# Patient Record
Sex: FEMALE | Race: WHITE | NOT HISPANIC OR LATINO | Employment: FULL TIME | URBAN - METROPOLITAN AREA
[De-identification: names, ages, dates, MRNs, and addresses within clinical notes are randomized per-mention and may not be internally consistent; named-entity substitution may affect disease eponyms.]

---

## 2023-03-10 ENCOUNTER — HOSPITAL ENCOUNTER (EMERGENCY)
Facility: HOSPITAL | Age: 28
End: 2023-03-11
Attending: EMERGENCY MEDICINE

## 2023-03-10 VITALS
HEIGHT: 66 IN | BODY MASS INDEX: 23.03 KG/M2 | SYSTOLIC BLOOD PRESSURE: 113 MMHG | WEIGHT: 143.3 LBS | RESPIRATION RATE: 16 BRPM | HEART RATE: 72 BPM | TEMPERATURE: 98.2 F | OXYGEN SATURATION: 99 % | DIASTOLIC BLOOD PRESSURE: 73 MMHG

## 2023-03-10 DIAGNOSIS — R45.851 SUICIDAL IDEATION: Primary | ICD-10-CM

## 2023-03-10 DIAGNOSIS — F32.A DEPRESSION: Primary | ICD-10-CM

## 2023-03-10 DIAGNOSIS — F32.A DEPRESSION: ICD-10-CM

## 2023-03-10 LAB
ALBUMIN SERPL BCP-MCNC: 4.4 G/DL (ref 3.5–5)
ALP SERPL-CCNC: 37 U/L (ref 34–104)
ALT SERPL W P-5'-P-CCNC: 19 U/L (ref 7–52)
AMPHETAMINES SERPL QL SCN: NEGATIVE
ANION GAP SERPL CALCULATED.3IONS-SCNC: 7 MMOL/L (ref 4–13)
APAP SERPL-MCNC: <10 UG/ML (ref 10–20)
AST SERPL W P-5'-P-CCNC: 17 U/L (ref 13–39)
BARBITURATES UR QL: NEGATIVE
BASOPHILS # BLD AUTO: 0.03 THOUSANDS/ÂΜL (ref 0–0.1)
BASOPHILS NFR BLD AUTO: 1 % (ref 0–1)
BENZODIAZ UR QL: NEGATIVE
BILIRUB SERPL-MCNC: 1.27 MG/DL (ref 0.2–1)
BILIRUB UR QL STRIP: NEGATIVE
BUN SERPL-MCNC: 17 MG/DL (ref 5–25)
CALCIUM SERPL-MCNC: 9.4 MG/DL (ref 8.4–10.2)
CHLORIDE SERPL-SCNC: 103 MMOL/L (ref 96–108)
CLARITY UR: CLEAR
CO2 SERPL-SCNC: 27 MMOL/L (ref 21–32)
COCAINE UR QL: NEGATIVE
COLOR UR: NORMAL
CREAT SERPL-MCNC: 0.68 MG/DL (ref 0.6–1.3)
EOSINOPHIL # BLD AUTO: 0.03 THOUSAND/ÂΜL (ref 0–0.61)
EOSINOPHIL NFR BLD AUTO: 1 % (ref 0–6)
ERYTHROCYTE [DISTWIDTH] IN BLOOD BY AUTOMATED COUNT: 12.2 % (ref 11.6–15.1)
ETHANOL SERPL-MCNC: <10 MG/DL
EXT PREGNANCY TEST URINE: NEGATIVE
EXT. CONTROL: NORMAL
GFR SERPL CREATININE-BSD FRML MDRD: 118 ML/MIN/1.73SQ M
GLUCOSE SERPL-MCNC: 100 MG/DL (ref 65–140)
GLUCOSE UR STRIP-MCNC: NEGATIVE MG/DL
HCT VFR BLD AUTO: 41.5 % (ref 34.8–46.1)
HGB BLD-MCNC: 13.9 G/DL (ref 11.5–15.4)
HGB UR QL STRIP.AUTO: NEGATIVE
IMM GRANULOCYTES # BLD AUTO: 0.01 THOUSAND/UL (ref 0–0.2)
IMM GRANULOCYTES NFR BLD AUTO: 0 % (ref 0–2)
KETONES UR STRIP-MCNC: NEGATIVE MG/DL
LEUKOCYTE ESTERASE UR QL STRIP: NEGATIVE
LYMPHOCYTES # BLD AUTO: 1.31 THOUSANDS/ÂΜL (ref 0.6–4.47)
LYMPHOCYTES NFR BLD AUTO: 25 % (ref 14–44)
MCH RBC QN AUTO: 33.7 PG (ref 26.8–34.3)
MCHC RBC AUTO-ENTMCNC: 33.5 G/DL (ref 31.4–37.4)
MCV RBC AUTO: 101 FL (ref 82–98)
METHADONE UR QL: NEGATIVE
MONOCYTES # BLD AUTO: 0.27 THOUSAND/ÂΜL (ref 0.17–1.22)
MONOCYTES NFR BLD AUTO: 5 % (ref 4–12)
NEUTROPHILS # BLD AUTO: 3.57 THOUSANDS/ÂΜL (ref 1.85–7.62)
NEUTS SEG NFR BLD AUTO: 68 % (ref 43–75)
NITRITE UR QL STRIP: NEGATIVE
NRBC BLD AUTO-RTO: 0 /100 WBCS
OPIATES UR QL SCN: NEGATIVE
OXYCODONE+OXYMORPHONE UR QL SCN: NEGATIVE
PCP UR QL: NEGATIVE
PH UR STRIP.AUTO: 7 [PH]
PLATELET # BLD AUTO: 224 THOUSANDS/UL (ref 149–390)
PMV BLD AUTO: 10.1 FL (ref 8.9–12.7)
POTASSIUM SERPL-SCNC: 3.6 MMOL/L (ref 3.5–5.3)
PROT SERPL-MCNC: 6.9 G/DL (ref 6.4–8.4)
PROT UR STRIP-MCNC: NEGATIVE MG/DL
RBC # BLD AUTO: 4.12 MILLION/UL (ref 3.81–5.12)
SALICYLATES SERPL-MCNC: <5 MG/DL (ref 3–20)
SODIUM SERPL-SCNC: 137 MMOL/L (ref 135–147)
SP GR UR STRIP.AUTO: 1.01
THC UR QL: NEGATIVE
TSH SERPL DL<=0.05 MIU/L-ACNC: 0.97 UIU/ML (ref 0.45–4.5)
UROBILINOGEN UR QL STRIP.AUTO: 0.2 E.U./DL
WBC # BLD AUTO: 5.22 THOUSAND/UL (ref 4.31–10.16)

## 2023-03-10 RX ORDER — HYDROXYZINE HYDROCHLORIDE 25 MG/1
25 TABLET, FILM COATED ORAL
Status: CANCELLED | OUTPATIENT
Start: 2023-03-10

## 2023-03-10 RX ORDER — FLUOXETINE 10 MG/1
10 CAPSULE ORAL DAILY
Status: DISCONTINUED | OUTPATIENT
Start: 2023-03-11 | End: 2023-03-11 | Stop reason: HOSPADM

## 2023-03-10 RX ORDER — PROPRANOLOL HYDROCHLORIDE 10 MG/1
10 TABLET ORAL EVERY 8 HOURS PRN
Status: CANCELLED | OUTPATIENT
Start: 2023-03-10

## 2023-03-10 RX ORDER — OLANZAPINE 2.5 MG/1
2.5 TABLET ORAL
Status: CANCELLED | OUTPATIENT
Start: 2023-03-10

## 2023-03-10 RX ORDER — OLANZAPINE 5 MG/1
5 TABLET ORAL
Status: CANCELLED | OUTPATIENT
Start: 2023-03-10

## 2023-03-10 RX ORDER — BENZTROPINE MESYLATE 1 MG/1
1 TABLET ORAL
Status: CANCELLED | OUTPATIENT
Start: 2023-03-10

## 2023-03-10 RX ORDER — LANOLIN ALCOHOL/MO/W.PET/CERES
3 CREAM (GRAM) TOPICAL
Status: CANCELLED | OUTPATIENT
Start: 2023-03-10

## 2023-03-10 RX ORDER — BISACODYL 10 MG
10 SUPPOSITORY, RECTAL RECTAL DAILY PRN
Status: CANCELLED | OUTPATIENT
Start: 2023-03-10

## 2023-03-10 RX ORDER — MIRTAZAPINE 15 MG/1
7.5 TABLET, FILM COATED ORAL
Status: CANCELLED | OUTPATIENT
Start: 2023-03-10

## 2023-03-10 RX ORDER — AMOXICILLIN 250 MG
1 CAPSULE ORAL DAILY PRN
Status: CANCELLED | OUTPATIENT
Start: 2023-03-10

## 2023-03-10 RX ORDER — IBUPROFEN 400 MG/1
400 TABLET ORAL EVERY 6 HOURS PRN
Status: CANCELLED | OUTPATIENT
Start: 2023-03-10

## 2023-03-10 RX ORDER — OLANZAPINE 10 MG/1
2.5 INJECTION, POWDER, LYOPHILIZED, FOR SOLUTION INTRAMUSCULAR
Status: CANCELLED | OUTPATIENT
Start: 2023-03-10

## 2023-03-10 RX ORDER — ACETAMINOPHEN 325 MG/1
650 TABLET ORAL EVERY 6 HOURS PRN
Status: CANCELLED | OUTPATIENT
Start: 2023-03-10

## 2023-03-10 RX ORDER — POLYETHYLENE GLYCOL 3350 17 G/17G
17 POWDER, FOR SOLUTION ORAL DAILY PRN
Status: CANCELLED | OUTPATIENT
Start: 2023-03-10

## 2023-03-10 RX ORDER — FLUOXETINE 10 MG/1
10 CAPSULE ORAL DAILY
COMMUNITY
Start: 2023-01-30 | End: 2023-03-14

## 2023-03-10 RX ORDER — OLANZAPINE 10 MG/1
10 TABLET, ORALLY DISINTEGRATING ORAL ONCE
Status: COMPLETED | OUTPATIENT
Start: 2023-03-10 | End: 2023-03-10

## 2023-03-10 RX ORDER — IBUPROFEN 600 MG/1
600 TABLET ORAL EVERY 8 HOURS PRN
Status: CANCELLED | OUTPATIENT
Start: 2023-03-10

## 2023-03-10 RX ORDER — DIPHENHYDRAMINE HYDROCHLORIDE 50 MG/ML
50 INJECTION INTRAMUSCULAR; INTRAVENOUS EVERY 6 HOURS PRN
Status: CANCELLED | OUTPATIENT
Start: 2023-03-10

## 2023-03-10 RX ORDER — MAGNESIUM HYDROXIDE/ALUMINUM HYDROXICE/SIMETHICONE 120; 1200; 1200 MG/30ML; MG/30ML; MG/30ML
30 SUSPENSION ORAL EVERY 4 HOURS PRN
Status: CANCELLED | OUTPATIENT
Start: 2023-03-10

## 2023-03-10 RX ORDER — BENZTROPINE MESYLATE 1 MG/ML
1 INJECTION INTRAMUSCULAR; INTRAVENOUS
Status: CANCELLED | OUTPATIENT
Start: 2023-03-10

## 2023-03-10 RX ORDER — HYDROXYZINE 50 MG/1
50 TABLET, FILM COATED ORAL
Status: CANCELLED | OUTPATIENT
Start: 2023-03-10

## 2023-03-10 RX ORDER — LORAZEPAM 2 MG/ML
2 INJECTION INTRAMUSCULAR EVERY 6 HOURS PRN
Status: CANCELLED | OUTPATIENT
Start: 2023-03-10

## 2023-03-10 RX ORDER — OLANZAPINE 10 MG/1
5 INJECTION, POWDER, LYOPHILIZED, FOR SOLUTION INTRAMUSCULAR
Status: CANCELLED | OUTPATIENT
Start: 2023-03-10

## 2023-03-10 RX ORDER — HYDROXYZINE 50 MG/1
100 TABLET, FILM COATED ORAL
Status: CANCELLED | OUTPATIENT
Start: 2023-03-10

## 2023-03-10 RX ADMIN — OLANZAPINE 10 MG: 10 TABLET, ORALLY DISINTEGRATING ORAL at 17:41

## 2023-03-10 NOTE — ED NOTES
Patient is accepted at [facility]  Patient is accepted by Dr Pepe Olea per Cristobal Rowe  Transportation is arranged with [employer]  Transportation is scheduled for **  Patient may go to the floor after 1930  Nurse report is to be called to [phone] prior to patient transfer

## 2023-03-10 NOTE — ED NOTES
201 prepared and obtained signatures  Patient informed about process and rights  No objection to placement locations  Referral send to Intake/SLL reviewing

## 2023-03-10 NOTE — ED NOTES
Patient presented to ED due to: anxiety, suicidal thoughts, depression  Patient calm and cooperative  Patient oriented x4  Patient currently lives with both parents  Patient currently works full time  Patient accompanied by the sister  Crisis spoke with patient alone  Patient reports getting Covid a month ago  Patients states she was unable to breath and developed panic attacks  Patient states she went to her PCP and it was suggested she sees psychiatrist  Patient did see psychiatrist who prescribed her Prozac  Patient reports situation got worse  Patient reports intrusive suicidal thoughts and is unable to complete daily tasks  Patient reports she is unable to focus, experiencing high anxiety while at work and at home  Patient states "I am crazy"  Patient very tearful, whispering like voice  Patient unable to contract for safety, asking for help  Patient denies trauma, but reports frequent parental yelling experience as child  Patient denies HI  Patient denies self-harming  Patient denies hallucinations  Patient reports lack of appetite  Patient reports lack of sleep  Patient sister reports patient not herself for about month with increase in anxiety, paranoia that other people lie to patient and/or talking about the patient  Sister states patient was unable to work today stating "I am getting crazy, I am crazy"  Crisis discussed treatment option, patient willing to sign 201

## 2023-03-11 ENCOUNTER — HOSPITAL ENCOUNTER (INPATIENT)
Facility: HOSPITAL | Age: 28
LOS: 3 days | Discharge: HOME | End: 2023-03-14
Attending: HOSPITALIST | Admitting: PSYCHIATRY & NEUROLOGY

## 2023-03-11 DIAGNOSIS — M25.50 ARTHRALGIA: ICD-10-CM

## 2023-03-11 DIAGNOSIS — G47.00 INSOMNIA: ICD-10-CM

## 2023-03-11 DIAGNOSIS — E55.9 VITAMIN D DEFICIENCY: ICD-10-CM

## 2023-03-11 DIAGNOSIS — F33.9 RECURRENT MAJOR DEPRESSIVE DISORDER (HCC): Primary | ICD-10-CM

## 2023-03-11 DIAGNOSIS — F32.A DEPRESSION: ICD-10-CM

## 2023-03-11 PROCEDURE — GZ59ZZZ INDIVIDUAL PSYCHOTHERAPY, PSYCHOPHYSIOLOGICAL: ICD-10-PCS | Performed by: HOSPITALIST

## 2023-03-11 PROCEDURE — GZHZZZZ GROUP PSYCHOTHERAPY: ICD-10-PCS | Performed by: HOSPITALIST

## 2023-03-11 RX ORDER — LANOLIN ALCOHOL/MO/W.PET/CERES
3 CREAM (GRAM) TOPICAL
Status: DISCONTINUED | OUTPATIENT
Start: 2023-03-11 | End: 2023-03-14 | Stop reason: HOSPADM

## 2023-03-11 RX ORDER — AMOXICILLIN 250 MG
1 CAPSULE ORAL DAILY PRN
Status: DISCONTINUED | OUTPATIENT
Start: 2023-03-11 | End: 2023-03-14 | Stop reason: HOSPADM

## 2023-03-11 RX ORDER — IBUPROFEN 400 MG/1
400 TABLET ORAL EVERY 6 HOURS PRN
Status: DISCONTINUED | OUTPATIENT
Start: 2023-03-11 | End: 2023-03-14 | Stop reason: HOSPADM

## 2023-03-11 RX ORDER — IBUPROFEN 600 MG/1
600 TABLET ORAL EVERY 8 HOURS PRN
Status: DISCONTINUED | OUTPATIENT
Start: 2023-03-11 | End: 2023-03-14 | Stop reason: HOSPADM

## 2023-03-11 RX ORDER — HYDROXYZINE 50 MG/1
100 TABLET, FILM COATED ORAL
Status: DISCONTINUED | OUTPATIENT
Start: 2023-03-11 | End: 2023-03-14 | Stop reason: HOSPADM

## 2023-03-11 RX ORDER — BENZTROPINE MESYLATE 1 MG/ML
1 INJECTION INTRAMUSCULAR; INTRAVENOUS
Status: DISCONTINUED | OUTPATIENT
Start: 2023-03-11 | End: 2023-03-14 | Stop reason: HOSPADM

## 2023-03-11 RX ORDER — PROPRANOLOL HYDROCHLORIDE 10 MG/1
10 TABLET ORAL EVERY 8 HOURS PRN
Status: DISCONTINUED | OUTPATIENT
Start: 2023-03-11 | End: 2023-03-14 | Stop reason: HOSPADM

## 2023-03-11 RX ORDER — HYDROXYZINE 50 MG/1
50 TABLET, FILM COATED ORAL
Status: DISCONTINUED | OUTPATIENT
Start: 2023-03-11 | End: 2023-03-14 | Stop reason: HOSPADM

## 2023-03-11 RX ORDER — MAGNESIUM HYDROXIDE/ALUMINUM HYDROXICE/SIMETHICONE 120; 1200; 1200 MG/30ML; MG/30ML; MG/30ML
30 SUSPENSION ORAL EVERY 4 HOURS PRN
Status: DISCONTINUED | OUTPATIENT
Start: 2023-03-11 | End: 2023-03-14 | Stop reason: HOSPADM

## 2023-03-11 RX ORDER — MIRTAZAPINE 15 MG/1
7.5 TABLET, FILM COATED ORAL
Status: DISCONTINUED | OUTPATIENT
Start: 2023-03-11 | End: 2023-03-14 | Stop reason: HOSPADM

## 2023-03-11 RX ORDER — BENZTROPINE MESYLATE 1 MG/1
1 TABLET ORAL
Status: DISCONTINUED | OUTPATIENT
Start: 2023-03-11 | End: 2023-03-14 | Stop reason: HOSPADM

## 2023-03-11 RX ORDER — OLANZAPINE 2.5 MG/1
2.5 TABLET ORAL
Status: DISCONTINUED | OUTPATIENT
Start: 2023-03-11 | End: 2023-03-14 | Stop reason: HOSPADM

## 2023-03-11 RX ORDER — OLANZAPINE 5 MG/1
5 TABLET ORAL
Status: DISCONTINUED | OUTPATIENT
Start: 2023-03-11 | End: 2023-03-14 | Stop reason: HOSPADM

## 2023-03-11 RX ORDER — ACETAMINOPHEN 325 MG/1
650 TABLET ORAL EVERY 6 HOURS PRN
Status: DISCONTINUED | OUTPATIENT
Start: 2023-03-11 | End: 2023-03-14 | Stop reason: HOSPADM

## 2023-03-11 RX ORDER — OLANZAPINE 10 MG/1
2.5 INJECTION, POWDER, LYOPHILIZED, FOR SOLUTION INTRAMUSCULAR
Status: DISCONTINUED | OUTPATIENT
Start: 2023-03-11 | End: 2023-03-14 | Stop reason: HOSPADM

## 2023-03-11 RX ORDER — DIPHENHYDRAMINE HYDROCHLORIDE 50 MG/ML
50 INJECTION INTRAMUSCULAR; INTRAVENOUS EVERY 6 HOURS PRN
Status: DISCONTINUED | OUTPATIENT
Start: 2023-03-11 | End: 2023-03-14 | Stop reason: HOSPADM

## 2023-03-11 RX ORDER — BISACODYL 10 MG
10 SUPPOSITORY, RECTAL RECTAL DAILY PRN
Status: DISCONTINUED | OUTPATIENT
Start: 2023-03-11 | End: 2023-03-13

## 2023-03-11 RX ORDER — HYDROXYZINE HYDROCHLORIDE 25 MG/1
25 TABLET, FILM COATED ORAL
Status: DISCONTINUED | OUTPATIENT
Start: 2023-03-11 | End: 2023-03-14 | Stop reason: HOSPADM

## 2023-03-11 RX ORDER — LORAZEPAM 2 MG/ML
2 INJECTION INTRAMUSCULAR EVERY 6 HOURS PRN
Status: DISCONTINUED | OUTPATIENT
Start: 2023-03-11 | End: 2023-03-14 | Stop reason: HOSPADM

## 2023-03-11 RX ORDER — POLYETHYLENE GLYCOL 3350 17 G/17G
17 POWDER, FOR SOLUTION ORAL DAILY PRN
Status: DISCONTINUED | OUTPATIENT
Start: 2023-03-11 | End: 2023-03-13

## 2023-03-11 RX ORDER — OLANZAPINE 10 MG/1
5 INJECTION, POWDER, LYOPHILIZED, FOR SOLUTION INTRAMUSCULAR
Status: DISCONTINUED | OUTPATIENT
Start: 2023-03-11 | End: 2023-03-14 | Stop reason: HOSPADM

## 2023-03-11 RX ADMIN — Medication 3 MG: at 21:25

## 2023-03-11 NOTE — PLAN OF CARE
Problem: DEPRESSION  Goal: Will be euthymic at discharge  Description: INTERVENTIONS:  - Administer medication as ordered  - Provide emotional support via 1:1 interaction with staff  - Encourage involvement in milieu/groups/activities  - Monitor for social isolation  Outcome: Progressing     Problem: ANXIETY  Goal: Will report anxiety at manageable levels  Description: INTERVENTIONS:  - Administer medication as ordered  - Teach and encourage coping skills  - Provide emotional support  - Assess patient/family for anxiety and ability to cope  Outcome: Progressing  Goal: By discharge: Patient will verbalize 2 strategies to deal with anxiety  Description: Interventions:  - Identify any obvious source/trigger to anxiety  - Staff will assist patient in applying identified coping technique/skills  - Encourage attendance of scheduled groups and activities  Outcome: Progressing     Care plan initiated, monitoring is ongoing

## 2023-03-11 NOTE — NURSING NOTE
Patient was admitted under a 201, Voluntary Commitment, via transfer from [facility] ED with reported increase in depression/sever anxiety, loss of appetite/ sleep disturbance, paranoia, intrusive thoughts making her feel like she is 'crazy'  Patient has brief h/o Psychiatric OP treatment initiated last month due to severe anxiety/ Panic/ obsessive worry about "getting people sick" upon return to work following a bout of COVID  Patient currently reported a feeling that sx were exaccerbated due to "the Prozac making me paranoid " Patient has no known Allergies  On interview, the patient was alert, oriented, quiet, markedly soft spoken, coherent with goal-attainment  Denied current SI/HI/A/V hallucinations  Reported increase in depression/with severe anxiety  Admitted to recurrent/intrusive thoughts in the past; believes Prozac is the responsible for sx increased  Additionally, stated she has had social anxiety, limiting her life choices "for years " Stated she had one previous panic attack 2 years ago for which she sought ER Rx; the only other episode was in January, prompting her to seek OP treatment  Patient was tearful through much of the interview  Difficulty with expressing thoughts/feelings, requiring much encouragement, support  Patient showered; body check completed  Cooperative with signatures to required documents  Oriented to unit  Will continue to monitor/ assess and assist in transitioning

## 2023-03-11 NOTE — ED NOTES
Pt  Picked up for transport  Family took pt  Belongings which included clothes and cell phone       Rebecca York RN  03/11/23 6009

## 2023-03-11 NOTE — ED NOTES
[facility] Suicide Risk Assessment deferred, as unable to assess while patient sleeping  Behavioral Health Assessment deferred as patient is sleeping and would benefit from additional rest   Vital signs deferred until patient awake, no signs or symptoms of respiratory distress at this time  Once patient is awake and able to participate, will complete assessments        Tristin Sommer RN  03/11/23 0284

## 2023-03-11 NOTE — ED NOTES
[facility] Suicide Risk Assessment deferred, as unable to assess while patient sleeping  Behavioral Health Assessment deferred as patient is sleeping and would benefit from additional rest   Vital signs deferred until patient awake, no signs or symptoms of respiratory distress at this time  Once patient is awake and able to participate, will complete assessments        Tristin Sommer RN  03/11/23 2443

## 2023-03-11 NOTE — ED PROVIDER NOTES
History  Chief Complaint   Patient presents with   • Psychiatric Evaluation     Pt present with sister who states the patient is requesting help for mental health, pt is tearful in triage and is not answering questions      Patient is a 24-year-old female with a history of anxiety/depression who presents for evaluation of worsening depression  Patient is nonverbal and unwilling to speak to me  Her sister presents with the patient and does tell me the patient has been doing increasingly worse over the past several weeks  Since she had COVID about 1 month ago, she has had increasing depression and anxiety  She is able to nod and shake her head appropriately to answer my questions  She does endorse suicidal ideations without a specific plan  She denies homicidal ideations  Increasing paranoia  She denies medical complaints occluding headache nausea vomiting chest pain dyspnea abdominal pain  Prior to Admission Medications   Prescriptions Last Dose Informant Patient Reported? Taking? FLUoxetine (PROzac) 10 mg capsule   Yes Yes   Sig: Take 10 mg by mouth daily      Facility-Administered Medications: None       Past Medical History:   Diagnosis Date   • Psychiatric disorder        History reviewed  No pertinent surgical history  History reviewed  No pertinent family history  I have reviewed and agree with the history as documented  E-Cigarette/Vaping     E-Cigarette/Vaping Substances     Social History     Tobacco Use   • Smoking status: Never   • Smokeless tobacco: Never   Substance Use Topics   • Alcohol use: Not Currently   • Drug use: Not Currently       Review of Systems   Constitutional: Negative for fever  HENT: Negative for sore throat  Respiratory: Negative for shortness of breath  Cardiovascular: Negative for chest pain  Gastrointestinal: Negative for abdominal pain  Genitourinary: Negative for dysuria  Musculoskeletal: Negative for back pain  Skin: Negative for rash  Neurological: Negative for light-headedness  Psychiatric/Behavioral: Positive for dysphoric mood and suicidal ideas  Negative for agitation  The patient is nervous/anxious  All other systems reviewed and are negative  Physical Exam  Physical Exam  Vitals reviewed  Constitutional:       General: She is not in acute distress  Appearance: She is well-developed  HENT:      Head: Normocephalic  Eyes:      Pupils: Pupils are equal, round, and reactive to light  Cardiovascular:      Rate and Rhythm: Normal rate and regular rhythm  Heart sounds: Normal heart sounds  Pulmonary:      Effort: Pulmonary effort is normal       Breath sounds: Normal breath sounds  Abdominal:      General: Bowel sounds are normal  There is no distension  Palpations: Abdomen is soft  Tenderness: There is no abdominal tenderness  There is no guarding  Musculoskeletal:         General: No tenderness or deformity  Normal range of motion  Cervical back: Normal range of motion and neck supple  Skin:     General: Skin is warm and dry  Capillary Refill: Capillary refill takes less than 2 seconds  Neurological:      Mental Status: She is alert  Cranial Nerves: No cranial nerve deficit  Sensory: No sensory deficit     Psychiatric:      Comments: Flat, no eye contact, nonverbal         Vital Signs  ED Triage Vitals   Temperature Pulse Respirations Blood Pressure SpO2   03/10/23 1258 03/10/23 1255 03/10/23 1255 03/10/23 1255 03/10/23 1255   98 2 °F (36 8 °C) 71 16 119/62 96 %      Temp Source Heart Rate Source Patient Position - Orthostatic VS BP Location FiO2 (%)   03/10/23 1258 03/10/23 1255 03/10/23 1255 03/10/23 1255 --   Oral Monitor Sitting Left arm       Pain Score       --                  Vitals:    03/10/23 1255 03/10/23 1808   BP: 119/62 115/74   Pulse: 71 72   Patient Position - Orthostatic VS: Sitting Sitting         Visual Acuity      ED Medications  Medications   OLANZapine (ZyPREXA ZYDIS) dispersible tablet 10 mg (10 mg Oral Given 3/10/23 1740)       Diagnostic Studies  Results Reviewed     Procedure Component Value Units Date/Time    Rapid drug screen, urine [[id number]]  (Normal) Collected: 03/10/23 1409    Lab Status: Final result Specimen: Urine, Clean Catch Updated: 03/10/23 1432     Amph/Meth UR Negative     Barbiturate Ur Negative     Benzodiazepine Urine Negative     Cocaine Urine Negative     Methadone Urine Negative     Opiate Urine Negative     PCP Ur Negative     THC Urine Negative     Oxycodone Urine Negative    Narrative:      FOR MEDICAL PURPOSES ONLY  IF CONFIRMATION NEEDED PLEASE CONTACT THE LAB WITHIN 5 DAYS      Drug Screen Cutoff Levels:  AMPHETAMINE/METHAMPHETAMINES  1000 ng/mL  BARBITURATES     200 ng/mL  BENZODIAZEPINES     200 ng/mL  COCAINE      300 ng/mL  METHADONE      300 ng/mL  OPIATES      300 ng/mL  PHENCYCLIDINE     25 ng/mL  THC       50 ng/mL  OXYCODONE      100 ng/mL    UA w Reflex to Microscopic w Reflex to Culture [[id number]]  (Normal) Collected: 03/10/23 1409    Lab Status: Final result Specimen: Urine, Clean Catch Updated: 03/10/23 1421     Color, UA Straw     Clarity, UA Clear     Specific Gravity, UA 1 015     pH, UA 7 0     Leukocytes, UA Negative     Nitrite, UA Negative     Protein, UA Negative mg/dl      Glucose, UA Negative mg/dl      Ketones, UA Negative mg/dl      Urobilinogen, UA 0 2 E U /dl      Bilirubin, UA Negative     Occult Blood, UA Negative    POCT pregnancy, urine [[id number]]  (Normal) Resulted: 03/10/23 1419    Lab Status: Final result Updated: 03/10/23 1419     EXT Preg Test, Ur Negative     Control Valid    TSH, 3rd generation with Free T4 reflex [[id number]]  (Normal) Collected: 03/10/23 1317    Lab Status: Final result Specimen: Blood from Arm, Left Updated: 03/10/23 1418     TSH 3RD GENERATON 0 973 uIU/mL     Narrative:      Patients undergoing fluorescein dye angiography may retain small amounts of fluorescein in the body for 48-72 hours post procedure  Samples containing fluorescein can produce falsely depressed TSH values  If the patient had this procedure,a specimen should be resubmitted post fluorescein clearance  Acetaminophen level-If concentration is detectable, please discuss with medical  on call   [[id number]]  (Abnormal) Collected: 03/10/23 1317    Lab Status: Final result Specimen: Blood from Arm, Left Updated: 03/10/23 1351     Acetaminophen Level <10 ug/mL     Comprehensive metabolic panel [[id number]]  (Abnormal) Collected: 03/10/23 1317    Lab Status: Final result Specimen: Blood from Arm, Left Updated: 03/10/23 1351     Sodium 137 mmol/L      Potassium 3 6 mmol/L      Chloride 103 mmol/L      CO2 27 mmol/L      ANION GAP 7 mmol/L      BUN 17 mg/dL      Creatinine 0 68 mg/dL      Glucose 100 mg/dL      Calcium 9 4 mg/dL      AST 17 U/L      ALT 19 U/L      Alkaline Phosphatase 37 U/L      Total Protein 6 9 g/dL      Albumin 4 4 g/dL      Total Bilirubin 1 27 mg/dL      eGFR 118 ml/min/1 73sq m     Narrative:      [facility] guidelines for Chronic Kidney Disease (CKD):   •  Stage 1 with normal or high GFR (GFR > 90 mL/min/1 73 square meters)  •  Stage 2 Mild CKD (GFR = 60-89 mL/min/1 73 square meters)  •  Stage 3A Moderate CKD (GFR = 45-59 mL/min/1 73 square meters)  •  Stage 3B Moderate CKD (GFR = 30-44 mL/min/1 73 square meters)  •  Stage 4 Severe CKD (GFR = 15-29 mL/min/1 73 square meters)  •  Stage 5 End Stage CKD (GFR <15 mL/min/1 73 square meters)  Note: GFR calculation is accurate only with a steady state creatinine    Salicylate level [[id number]]  (Normal) Collected: 03/10/23 1317    Lab Status: Final result Specimen: Blood from Arm, Left Updated: 93/72/48 2125     Salicylate Lvl <5 mg/dL     Ethanol [[id number]]  (Normal) Collected: 03/10/23 1317    Lab Status: Final result Specimen: Blood from Arm, Left Updated: 03/10/23 1343     Ethanol Lvl <10 mg/dL     CBC and differential [[id number]]  (Abnormal) Collected: 03/10/23 1317    Lab Status: Final result Specimen: Blood from Arm, Left Updated: 03/10/23 1324     WBC 5 22 Thousand/uL      RBC 4 12 Million/uL      Hemoglobin 13 9 g/dL      Hematocrit 41 5 %       fL      MCH 33 7 pg      MCHC 33 5 g/dL      RDW 12 2 %      MPV 10 1 fL      Platelets 986 Thousands/uL      nRBC 0 /100 WBCs      Neutrophils Relative 68 %      Immat GRANS % 0 %      Lymphocytes Relative 25 %      Monocytes Relative 5 %      Eosinophils Relative 1 %      Basophils Relative 1 %      Neutrophils Absolute 3 57 Thousands/µL      Immature Grans Absolute 0 01 Thousand/uL      Lymphocytes Absolute 1 31 Thousands/µL      Monocytes Absolute 0 27 Thousand/µL      Eosinophils Absolute 0 03 Thousand/µL      Basophils Absolute 0 03 Thousands/µL                  No orders to display              Procedures  Procedures         ED Course                               SBIRT 20yo+    Flowsheet Row Most Recent Value   SBIRT (25 yo +)    In order to provide better care to our patients, we are screening all of our patients for alcohol and drug use  Would it be okay to ask you these screening questions? No Filed at: 03/10/2023 1332                    Medical Decision Making  Patient is a 68-year-old female who presents for evaluation of worsening depression and anxiety  Medical work-up obtained secondary to nonverbal status which was negative  201 signed, pending psychiatric placement at the time of signout  Depression: acute illness or injury  Suicidal ideation: complicated acute illness or injury that poses a threat to life or bodily functions  Amount and/or Complexity of Data Reviewed  Labs: ordered  Risk  Prescription drug management  Decision regarding hospitalization            Disposition  Final diagnoses:   Suicidal ideation   Depression     Time reflects when diagnosis was documented in both MDM as applicable and the Disposition within this note Time User Action Codes Description Comment    3/10/2023  1:52 PM Cam Imam Add [I31 001] Suicidal ideation     3/10/2023  1:52 PM Yovany Collins Add Ricki KINGSLEY] Depression       ED Disposition     ED Disposition   Transfer to Behavioral Health Condition   --    Date/Time   Fri Mar 10, 2023  1:52 PM    Comment   Sandi Hinkle should be transferred out to [facility] and has been medically cleared  Follow-up Information    None         Patient's Medications   Discharge Prescriptions    No medications on file       No discharge procedures on file      PDMP Review     None          ED Provider  Electronically Signed by           Virgil Zamora MD  03/10/23 4986

## 2023-03-11 NOTE — ED NOTES
Pt  Belongings given to family  Clothes and cell phone taken by sister  Report called to [facility] and pt   Transported to [address] [place] [address] via Ramón Lemon RN  03/11/23 2679

## 2023-03-11 NOTE — ED NOTES
3/11/2022 @ 4033: Insurance Authorization for admission:   Phone call placed to HOLGER Moon    Phone number: [phone] ([place] [phone])    *Advised to call back during normal business hours Monday through Friday 8:30am to 4:00pm

## 2023-03-11 NOTE — EMTALA/ACUTE CARE TRANSFER
[facility] [address]  Dept: [phone]      EMTALA TRANSFER CONSENT    NAME Charline Chacon 1995                              MRN [id number]    I have been informed of my rights regarding examination, treatment, and transfer   by Dr Paluo Palmer , *    Benefits: Specialized equipment and/or services available at the receiving facility (Include comment)________________________ (Behavioral health)    Risks: Potential for delay in receiving treatment, Potential deterioration of medical condition      Consent for Transfer:  I acknowledge that my medical condition has been evaluated and explained to me by the emergency department physician or other qualified medical person and/or my attending physician, who has recommended that I be transferred to the service of  Accepting Physician: Dr Vick Salinas at [address] [place] [address] Name, Indira [address] : [address] Adult  The above potential benefits of such transfer, the potential risks associated with such transfer, and the probable risks of not being transferred have been explained to me, and I fully understand them  The doctor has explained that, in my case, the benefits of transfer outweigh the risks  I agree to be transferred  I authorize the performance of emergency medical procedures and treatments upon me in both transit and upon arrival at the receiving facility  Additionally, I authorize the release of any and all medical records to the receiving facility and request they be transported with me, if possible  I understand that the safest mode of transportation during a medical emergency is an ambulance and that the Hospital advocates the use of this mode of transport   Risks of traveling to the receiving facility by car, including absence of medical control, life sustaining equipment, such as oxygen, and medical personnel has been explained to me and I fully understand them  (SANAN CORRECT BOX BELOW)  [ X ]  I consent to the stated transfer and to be transported by ambulance/helicopter  [  ]  I consent to the stated transfer, but refuse transportation by ambulance and accept full responsibility for my transportation by car  I understand the risks of non-ambulance transfers and I exonerate the Hospital and its staff from any deterioration in my condition that results from this refusal     X___________________________________________    DATE  23  TIME________  Signature of patient or legally responsible individual signing on patient behalf           RELATIONSHIP TO PATIENT_________________________          Provider Certification    NAME Sandi Hinkle                                        [facility] 1995                              MRN [id number]    A medical screening exam was performed on the above named patient  Based on the examination:    Condition Necessitating Transfer The primary encounter diagnosis was Suicidal ideation  A diagnosis of Depression was also pertinent to this visit      Patient Condition: The patient has been stabilized such that within reasonable medical probability, no material deterioration of the patient condition or the condition of the unborn child(amanda) is likely to result from the transfer    Reason for Transfer: Level of Care needed not available at this facility    Transfer Requirements: [facility]   · Space available and qualified personnel available for treatment as acknowledged by Kelly Aly  · Agreed to accept transfer and to provide appropriate medical treatment as acknowledged by       Dr Dorothy Hdez  · Appropriate medical records of the examination and treatment of the patient are provided at the time of transfer   [address] _______  · Transfer will be performed by qualified personnel from [phone]  and appropriate transfer equipment as required, including the use of necessary and appropriate life support measures  Provider Certification: I have examined the patient and explained the following risks and benefits of being transferred/refusing transfer to the patient/family:         Based on these reasonable risks and benefits to the patient and/or the unborn child(amanda), and based upon the information available at the time of the patient’s examination, I certify that the medical benefits reasonably to be expected from the provision of appropriate medical treatments at another medical facility outweigh the increasing risks, if any, to the individual’s medical condition, and in the case of labor to the unborn child, from effecting the transfer      X____________________________________________ DATE 03/11/23        TIME_______      ORIGINAL - SEND TO MEDICAL RECORDS   COPY - SEND WITH PATIENT DURING TRANSFER

## 2023-03-11 NOTE — ED NOTES
Insurance Authorization for admission:   Phone call placed to HOLGER Moon  Phone number: [phone] (mental health line)  [facility] received an automated message stating to call back customer service for pre-cert on Saturday at 2938 as they are currently closed

## 2023-03-11 NOTE — ED NOTES
Following TT sent to [address] RN:     Job Youngblood  Pt in ED28, Nicolas Acuña, will be picked tomorrow, 3/11/2023 at 0930 via [facility] & transported to [facility]BE[facility]AL [facility] DEPART[facility]  Nurse report is to be called to [phone] prior to patient transfer     TT sent to Network Intake & Referral to advise

## 2023-03-11 NOTE — H&P
Psychiatric Evaluation - Catrachita Torrez [age] y o  female MRN: [id number]  Unit/Bed#: -01 Encounter: [id number]    Assessment/Plan   Principal Problem:    Major depressive disorder, recurrent, severe, with psychotic features    Plan:   Obtain EKG for QTc baseline and if indicated, will start Abilify 2 mg for mood and psychosis  Admission labs  Frequent safety checks and vitals per unit protocol  Treatment options and alternatives were reviewed with the patient       -----------------------------------    Chief Complaint: " I was taking Prozac for 1 month began to have paranoia"    History of Present Illness     Sarmad is a 32 y o  female who presents voluntarily via a 201 for depression  Patient initially presented to the [address] [place] [address] emergency department  Per crisis worker Eliana Fernández note on 3/10/23: "Patient presented to ED due to: anxiety, suicidal thoughts, depression  Patient calm and cooperative  Patient oriented x4  Patient currently lives with both parents  Patient currently works full time  Patient accompanied by the sister  Crisis spoke with patient alone  Patient reports getting Covid a month ago  Patients states she was unable to breath and developed panic attacks  Patient states she went to her PCP and it was suggested she sees psychiatrist  Patient did see psychiatrist who prescribed her Prozac  Patient reports situation got worse  Patient reports intrusive suicidal thoughts and is unable to complete daily tasks  Patient reports she is unable to focus, experiencing high anxiety while at work and at home  Patient states "I am crazy"  Patient very tearful, whispering like voice  Patient unable to contract for safety, asking for help  Patient denies trauma, but reports frequent parental yelling experience as child  Patient denies HI  Patient denies self-harming  Patient denies hallucinations  Patient reports lack of appetite  Patient reports lack of sleep    Patient sister reports patient not herself for about month with increase in anxiety, paranoia that other people lie to patient and/or talking about the patient  Sister states patient was unable to work today stating "I am getting crazy, I am crazy"  Crisis discussed treatment option, patient willing to sign [phone] "      [facility] states she has had a history of depression and anxiety for years  Reports that her symptoms have been consistent  Reports that in January she was diagnosed with COVID  Reports that when she returned back to work she had a panic attack  She reports that she was referred to a psychiatrist and that she was started on Prozac  She reports that over the following weeks she felt that she had some improvement in anxiety and felt that she had a "good mood" more often  Reports that for the past week she has had an increase in paranoia  Reports that she recently saw her psychiatrist on Thursday and states that she did not tell him this at the time  States that she did not do this because she did not "realize" that it was paranoia at the time  She reports that prior to presentation to the emergency department she had felt paranoid at work, had contacted her sister, who she states brought her to the emergency department  Patient endorses symptomatology of depression including low mood, decreased sleep, decreased appetite, reports few pound weight loss, decreased interest   Reports adequate motivation and energy  Patient reports having crying spells  She reports recent suicidal ideation  Denies current suicidal or homicidal ideation, plan, or intent  She denies auditory or visual hallucinations  She discussed her paranoia more stating that she feels paranoid about "everyone"  Reports that there is a secret being held from her  Reports that she feels that people are watching her and spying on her  Reports feeling that her phone is being monitored    Patient appears to be guarded and paranoid, made limited eye contact  Patient initially gave an unclear response when asked if she experiences thought broadcasting/thought insertion and related it to her paranoia  After clarifying this patient did not seem to endorse thought broadcasting or thought insertion, and did not endorse ideas of reference  She does not endorse any current or previous episodes of crystal/hypomania  She reports that she recently had stopped taking Prozac and that she does not want to take this medication again  Reports being interested in starting a new medication to improve her symptoms  Medical Review Of Systems:  Complete review of systems is negative except as noted above  Psychiatric Review Of Systems:  Problems with sleep: yes, decreased  Appetite changes: yes, decreased  Weight changes: yes, decreased  Low energy/anergy: no  Low interest/pleasure/anhedonia: yes  Somatic symptoms: no  Anxiety/panic: yes  Crystal: Does not endorse any current or previous episodes of crystal/hypomania  Guilt/hopeless: no  Self injurious behavior/risky behavior: no    Historical Information     Psychiatric History:   Prior psychiatric diagnoses: reports "depression, anxiety, social anxiety"  Inpatient hospitalizations: patient denies  Suicide attempts: patient denies  Self-harm behaviors: patient denies  Violent behavior: patient denies  Outpatient treatment: Reports that she follows with Dr Tevin Beltran  Psychiatric medication trial: Prozac, denies others    Substance Abuse History:  Social History     Tobacco Use   • Smoking status: Never   • Smokeless tobacco: Never   Substance Use Topics   • Alcohol use: Not Currently   • Drug use: Not Currently   Denies alcohol, drug, or tobacco use  UDS negative     I have assessed this patient for substance use within the past 12 months      Family Psychiatric History:   Patient denies any known family history of psychiatric illness, suicide attempt, or substance abuse    Social History  Marital history: Single  Children: denies  Living arrangement: lives with parents  Functioning Relationships: parents and sister  Education: college graduate  Occupational History: works as an  x 6 years  Other Pertinent History: denies [employer] service or legal trouble       Traumatic History:   Abuse: denies  Other Traumatic Events: denies    Past Medical History:   Past Medical History:   Diagnosis Date   • Psychiatric disorder         -----------------------------------  Objective    Temp:  [97 9 °F (36 6 °C)] 97 9 °F (36 6 °C)  HR:  [60-72] 60  Resp:  [16] 16  BP: (107-115)/(61-74) 107/61    Mental Status Exam:  Appearance:  alert, limited eye contact, appears stated age, appropriate grooming and hygiene and in hospital scrubs   Behavior:  calm, cooperative and guarded   Motor: no abnormal movements and normal gait and balance   Speech:  soft   Mood:  depressed and anxious   Affect:  blunted, dysphoric and anxious   Thought Process:  Organized, logical, goal-directed   Thought Content: paranoia   No delusions elicited   Perceptual disturbances: denies current hallucinations, does not appear to be responding to internal stimuli at this time and Appears internally preoccupied   Risk Potential: Denies suicidal or homicidal ideation, plan, or intent   Cognition: oriented to person, place, time, and situation, appears to be of average intelligence and cognition not formally tested   Insight:  moderate   Judgment: Fair       Meds/Allergies   No Known Allergies  all current active meds have been reviewed, current meds:   Current Facility-Administered Medications   Medication Dose Route Frequency   • acetaminophen (TYLENOL) tablet 650 mg  650 mg Oral Q6H PRN   • aluminum-magnesium hydroxide-simethicone (MYLANTA) oral suspension 30 mL  30 mL Oral Q4H PRN   • benztropine (COGENTIN) injection 1 mg  1 mg Intramuscular Q4H PRN Max 6/day   • benztropine (COGENTIN) tablet 1 mg  1 mg Oral Q4H PRN Max 6/day   • bisacodyl (DULCOLAX) rectal suppository 10 mg  10 mg Rectal Daily PRN   • hydrOXYzine HCL (ATARAX) tablet 50 mg  50 mg Oral Q6H PRN Max 4/day    Or   • diphenhydrAMINE (BENADRYL) injection 50 mg  50 mg Intramuscular Q6H PRN   • glycerin-hypromellose- (ARTIFICIAL TEARS) ophthalmic solution 1 drop  1 drop Both Eyes Q3H PRN   • hydrOXYzine HCL (ATARAX) tablet 100 mg  100 mg Oral Q6H PRN Max 4/day    Or   • LORazepam (ATIVAN) injection 2 mg  2 mg Intramuscular Q6H PRN   • hydrOXYzine HCL (ATARAX) tablet 25 mg  25 mg Oral Q6H PRN Max 4/day   • ibuprofen (MOTRIN) tablet 400 mg  400 mg Oral Q6H PRN   • ibuprofen (MOTRIN) tablet 600 mg  600 mg Oral Q8H PRN   • melatonin tablet 3 mg  3 mg Oral HS   • mirtazapine (REMERON) tablet 7 5 mg  7 5 mg Oral HS PRN   • OLANZapine (ZyPREXA) tablet 5 mg  5 mg Oral Q4H PRN Max 3/day    Or   • OLANZapine (ZyPREXA) IM injection 2 5 mg  2 5 mg Intramuscular Q4H PRN Max 3/day   • OLANZapine (ZyPREXA) tablet 5 mg  5 mg Oral Q3H PRN Max 3/day    Or   • OLANZapine (ZyPREXA) IM injection 5 mg  5 mg Intramuscular Q3H PRN Max 3/day   • OLANZapine (ZyPREXA) tablet 2 5 mg  2 5 mg Oral Q4H PRN Max 6/day   • polyethylene glycol (MIRALAX) packet 17 g  17 g Oral Daily PRN   • propranolol (INDERAL) tablet 10 mg  10 mg Oral Q8H PRN   • senna-docusate sodium (SENOKOT S) 8 6-50 mg per tablet 1 tablet  1 tablet Oral Daily PRN    and PTA meds:   Prior to Admission Medications   Prescriptions Last Dose Informant Patient Reported? Taking? FLUoxetine (PROzac) 10 mg capsule 3/10/2023  Yes Yes   Sig: Take 10 mg by mouth daily      Facility-Administered Medications: None       Behavioral Health Medications: all current active meds have been reviewed  Changes as in Plan section above  Laboratory results:  I have personally reviewed all pertinent laboratory/tests results    Recent Results (from the past 48 hour(s))   CBC and differential    Collection Time: 03/10/23  1:17 PM   Result Value Ref Range    WBC 5 22 4 31 - 10 16 Thousand/uL    RBC 4 12 3 81 - 5 12 Million/uL    Hemoglobin 13 9 11 5 - 15 4 g/dL    Hematocrit 41 5 34 8 - 46 1 %     (H) 82 - 98 fL    MCH 33 7 26 8 - 34 3 pg    MCHC 33 5 31 4 - 37 4 g/dL    RDW 12 2 11 6 - 15 1 %    MPV 10 1 8 9 - 12 7 fL    Platelets 435 627 - 416 Thousands/uL    nRBC 0 /100 WBCs    Neutrophils Relative 68 43 - 75 %    Immat GRANS % 0 0 - 2 %    Lymphocytes Relative 25 14 - 44 %    Monocytes Relative 5 4 - 12 %    Eosinophils Relative 1 0 - 6 %    Basophils Relative 1 0 - 1 %    Neutrophils Absolute 3 57 1 85 - 7 62 Thousands/µL    Immature Grans Absolute 0 01 0 00 - 0 20 Thousand/uL    Lymphocytes Absolute 1 31 0 60 - 4 47 Thousands/µL    Monocytes Absolute 0 27 0 17 - 1 22 Thousand/µL    Eosinophils Absolute 0 03 0 00 - 0 61 Thousand/µL    Basophils Absolute 0 03 0 00 - 0 10 Thousands/µL   Comprehensive metabolic panel    Collection Time: 03/10/23  1:17 PM   Result Value Ref Range    Sodium 137 135 - 147 mmol/L    Potassium 3 6 3 5 - 5 3 mmol/L    Chloride 103 96 - 108 mmol/L    CO2 27 21 - 32 mmol/L    ANION GAP 7 4 - 13 mmol/L    BUN 17 5 - 25 mg/dL    Creatinine 0 68 0 60 - 1 30 mg/dL    Glucose 100 65 - 140 mg/dL    Calcium 9 4 8 4 - 10 2 mg/dL    AST 17 13 - 39 U/L    ALT 19 7 - 52 U/L    Alkaline Phosphatase 37 34 - 104 U/L    Total Protein 6 9 6 4 - 8 4 g/dL    Albumin 4 4 3 5 - 5 0 g/dL    Total Bilirubin 1 27 (H) 0 20 - 1 00 mg/dL    eGFR 118 ml/min/1 73sq m   TSH, 3rd generation with Free T4 reflex    Collection Time: 03/10/23  1:17 PM   Result Value Ref Range    TSH 3RD GENERATON 0 973 0 450 - 4 500 uIU/mL   Ethanol    Collection Time: 03/10/23  1:17 PM   Result Value Ref Range    Ethanol Lvl <89 <96 mg/dL   Salicylate level    Collection Time: 03/10/23  1:17 PM   Result Value Ref Range    Salicylate Lvl <5 3 - 20 mg/dL   Acetaminophen level-If concentration is detectable, please discuss with medical  on call      Collection Time: 03/10/23  1:17 PM   Result Value Ref Range    Acetaminophen Level <10 (L) 10 - 20 ug/mL   UA w Reflex to Microscopic w Reflex to Culture    Collection Time: 03/10/23  2:09 PM    Specimen: Urine, Clean Catch   Result Value Ref Range    Color, UA Straw Yellow, Straw    Clarity, UA Clear Hazy, Clear    Specific Gravity, UA 1 015 >1 005 - <1 030    pH, UA 7 0 5 0, 5 5, 6 0, 6 5, 7 0, 7 5    Leukocytes, UA Negative Negative    Nitrite, UA Negative Negative    Protein, UA Negative Negative, Interference- unable to analyze mg/dl    Glucose, UA Negative Negative mg/dl    Ketones, UA Negative Negative mg/dl    Urobilinogen, UA 0 2 0 2, 1 0 E U /dl E U /dl    Bilirubin, UA Negative Negative    Occult Blood, UA Negative Negative   Rapid drug screen, urine    Collection Time: 03/10/23  2:09 PM   Result Value Ref Range    Amph/Meth UR Negative Negative    Barbiturate Ur Negative Negative    Benzodiazepine Urine Negative Negative    Cocaine Urine Negative Negative    Methadone Urine Negative Negative    Opiate Urine Negative Negative    PCP Ur Negative Negative    THC Urine Negative Negative    Oxycodone Urine Negative Negative   POCT pregnancy, urine    Collection Time: 03/10/23  2:19 PM   Result Value Ref Range    EXT Preg Test, Ur Negative     Control Valid         Imaging Studies:   No orders to display            -----------------------------------    Risks / Benefits of Treatment:  Risks, benefits, and possible side effects of medications were explained to patient  The patient was able to verbalize understanding and agree for treatment  Counseling / Coordination of Care:  Patient's presentation on admission and proposed treatment plan were discussed with the treatment team   Diagnosis, medication changes and treatment plan were reviewed with the patient  Recent stressors were discussed with the patient  Events leading to admission were reviewed with the patient  Importance of medication and treatment compliance was reviewed with the patient  Inpatient Psychiatric Certification:     Certification: Based upon physical, mental and social evaluations, I certify that inpatient psychiatric services are medically necessary for this patient for a duration of 7 midnights for the treatment of Recurrent major depressive disorder ([facility] [address] )  Available alternative community resources do not meet the patient's mental health care needs  I further attest that an established written individualized plan of care has been implemented and is outlined in the patient's medical records        Cynthia Montano DO

## 2023-03-11 NOTE — ED NOTES
Patient is accepted at [address] [facility]  Patient is accepted by Dr Lucio Burgos per Vonda Arteaga, [facility]  Transportation is arranged with [facility]  Transportation is scheduled for 0930 3/11/2023  Patient may go to the floor at hospitals - EAT 3/10/2023  Nurse report is to be called to [phone] prior to patient transfer

## 2023-03-11 NOTE — ED CARE HANDOFF
Emergency Department Sign Out Note        Sign out and transfer of care from Dr Manuel Perales  See Separate Emergency Department note  The patient, Yuko Mclean, was evaluated by the previous provider for psychiatric evaluation  Workup Completed:  Psychiatric clearance    ED Course / Workup Pending (followup):  SI w/ no plan  201 signed  Bed search pending  Patient resting in NAD                                  ED Course as of 03/11/23 0049   Fri Mar 10, 2023   2107 Depressed and anxious, SI w/o plan  201 signed, pending bed search  Procedures  MDM        Disposition  Final diagnoses:   Suicidal ideation   Depression     Time reflects when diagnosis was documented in both MDM as applicable and the Disposition within this note     Time User Action Codes Description Comment    3/10/2023  1:52 PM Kendy Ngo Add [A65 [id number]] Suicidal ideation     3/10/2023  1:52 PM Man Collins Add Rosmery KINGSLEY] Depression       ED Disposition     ED Disposition   Transfer to Behavioral Health Condition   --    Date/Time   Fri Mar 10, 2023  1:52 PM    Comment   Yuko Mclean should be transferred out to [facility] and has been medically cleared  Follow-up Information    None       Patient's Medications   Discharge Prescriptions    No medications on file     No discharge procedures on file         ED Provider  Electronically Signed by     Anum Calderón DO  03/11/23 0513

## 2023-03-11 NOTE — NURSING NOTE
Patient submitted a 72 hour withdrawal notice on 3/11/23 at 1357  Patient spoke with provider who is aware  Encouragement offered to reconsider, particularly having been admitted only within the past few hours  Patient stated this is not "what I need " Will continue to assess and offer opportunity to rescind

## 2023-03-12 LAB
25(OH)D3 SERPL-MCNC: 23.4 NG/ML (ref 30–100)
ATRIAL RATE: 61 BPM
FOLATE SERPL-MCNC: 18.3 NG/ML (ref 3.1–17.5)
P AXIS: 77 DEGREES
PR INTERVAL: 150 MS
QRS AXIS: 69 DEGREES
QRSD INTERVAL: 86 MS
QT INTERVAL: 416 MS
QTC INTERVAL: 418 MS
T WAVE AXIS: 80 DEGREES
VENTRICULAR RATE: 61 BPM
VIT B12 SERPL-MCNC: 609 PG/ML (ref 100–900)

## 2023-03-12 RX ORDER — ARIPIPRAZOLE 2 MG/1
2 TABLET ORAL DAILY
Status: DISCONTINUED | OUTPATIENT
Start: 2023-03-12 | End: 2023-03-14 | Stop reason: HOSPADM

## 2023-03-12 RX ORDER — ARIPIPRAZOLE 5 MG/1
5 TABLET ORAL DAILY
Status: DISCONTINUED | OUTPATIENT
Start: 2023-03-12 | End: 2023-03-12

## 2023-03-12 RX ADMIN — ARIPIPRAZOLE 2 MG: 2 TABLET ORAL at 17:00

## 2023-03-12 NOTE — NURSING NOTE
Patient is a new admission this afternoon  Patient is guarded and withdrawn to her room  Patient denies SI and HI  Patient hesitates to speak and answer questions  Patient stayed in her room most of the evening shift and slept

## 2023-03-12 NOTE — PROGRESS NOTES
Progress Note - Giles Lopez 32 y o  female MRN: [id number]    Unit/Bed#: [facility] 251-01 Encounter: [id number]        Subjective:   Patient seen and examined at bedside after reviewing the chart and discussing the case with the caring staff  Patient examined at bedside  Patient has no acute complaints  Labs pending  Physical Exam   Vitals: Blood pressure 102/59, pulse 77, temperature 98 4 °F (36 9 °C), temperature source Temporal, resp  rate 18, height 5' 6" (1 676 m), weight 65 kg (143 lb 4 8 oz), SpO2 98 %  ,Body mass index is 23 13 kg/m²  Constitutional: Patient in no acute distress  HEENT: PERR, EOMI, MMM  Cardiovascular: Normal rate and regular rhythm  Pulmonary/Chest: Effort normal and breath sounds normal    Abdomen: Non distended  Assessment/Plan:  Giles Lopez is a(n) 32 y o  female with MDD      1  Allergic rhinitis  Stable  2  Insomnia  Patient is on melatonin  3  Arthralgia/headaches  Tylenol as needed  The patient was discussed with Dr Celia Damon and he is in agreement with the above note  Standing/Toileting/Walking

## 2023-03-12 NOTE — PROGRESS NOTES
Progress Note - Ryan [address] A Kurt 32 y o  female MRN: [id number]  Unit/Bed#: [facility] 251-01 Encounter: [id number]      Subjective:  Patient evaluated this morning for continuity of care  Patient was discussed at length with nursing and treatment team  Per nursing, patient signed a 72-hour notice yesterday  Patient was calm and cooperative during interview  Reports that she feels "better, calmer"  Reports that her mood is "okay"  Reports that she slept better last night  Reports having adequate appetite  She reports that her depression is 3-4 out of 10 (with 10 being the worst) and that her anxiety is 3-4 out of 10  Elaina Alfonso denies suicidal or homicidal ideation, plan, or intent  Denies auditory or visual hallucinations  She reports continuing to feel paranoid        Current Medications:  Current Facility-Administered Medications   Medication Dose Route Frequency Provider Last Rate   • acetaminophen  650 mg Oral Q6H PRN Irineo Street MD     • aluminum-magnesium hydroxide-simethicone  30 mL Oral Q4H PRN Irineo Street MD     • ARIPiprazole  2 mg Oral Daily Asiya Mi DO     • benztropine  1 mg Intramuscular Q4H PRN Max 6/day Irineo Street MD     • benztropine  1 mg Oral Q4H PRN Max 6/day Irineo Street MD     • bisacodyl  10 mg Rectal Daily PRN Irineo Street MD     • hydrOXYzine HCL  50 mg Oral Q6H PRN Max 4/day Irineo Street MD      Or   • diphenhydrAMINE  50 mg Intramuscular Q6H PRN Irineo Street MD     • glycerin-hypromellose-  1 drop Both Eyes Q3H PRN Irineo Street MD     • hydrOXYzine HCL  100 mg Oral Q6H PRN Max 4/day Irineo Street MD      Or   • LORazepam  2 mg Intramuscular Q6H PRN Irineo Street MD     • hydrOXYzine HCL  25 mg Oral Q6H PRN Max 4/day Irineo Street MD     • ibuprofen  400 mg Oral Q6H PRN Irineo Street MD     • ibuprofen  600 mg Oral Q8H PRN Irineo Street MD     • melatonin  3 mg Oral HS Irineo Street MD     • mirtazapine  7 5 mg Oral HS PRN Irineo Street MD     • OLANZapine  5 mg Oral Q4H PRN Max 3/day Williams Wells MD      Or   • OLANZapine  2 5 mg Intramuscular Q4H PRN Max 3/day Williams Wells MD     • OLANZapine  5 mg Oral Q3H PRN Max 3/day Williams Wells MD      Or   • OLANZapine  5 mg Intramuscular Q3H PRN Max 3/day Williams Wells MD     • OLANZapine  2 5 mg Oral Q4H PRN Max 6/day Williams Wells MD     • polyethylene glycol  17 g Oral Daily PRN Williams Wells MD     • propranolol  10 mg Oral Q8H PRN Williams Wells MD     • senna-docusate sodium  1 tablet Oral Daily PRN Williams Wells MD           Vital signs in last 24 hours:  Temp:  [97 8 °F (36 6 °C)-98 4 °F (36 9 °C)] 97 8 °F (36 6 °C)  HR:  [60-77] 68  Resp:  [16-18] 16  BP: (102-107)/(57-61) 104/57    Laboratory results:    I have personally reviewed all pertinent laboratory/tests results  EKG   Lab Results   Component Value Date    VENTRATE 61 03/12/2023    ATRIALRATE 61 03/12/2023    PRINT 150 03/12/2023    QRSDINT 86 03/12/2023    QTINT 416 03/12/2023    QTCINT 418 03/12/2023    PAXIS 77 03/12/2023    QRSAXIS 69 03/12/2023    TWAVEAXIS 80 03/12/2023           Mental Status Evaluation:  Appearance:  appears stated age   Behavior:  calm, cooperative and guarded   Speech:  soft and scant   Mood:   "Okay"   Affect:   Blunted, anxious, dysphoric   Thought Process:  goal directed and organized   Thought Content:  paranoia  No delusions elicited   Perceptual Disturbances: Denies auditory or visual hallucinations and Does not appear to be responding to internal stimuli    Appears to be internally preoccupied   Risk Potential: Denies suicidal or homicidal ideation, plan, or intent   Sensorium:  person, place and time/date   Cognition:  appears grossly intact   Consciousness:  alert and awake   Attention: attention span and concentration were age appropriate   Insight:  Moderate   Judgment: fair   Gait/Station: not assessed   Motor Activity: no abnormal movements         Assessment/Plan   Principal Problem:    Major depressive disorder, recurrent, severe, with psychotic features        Recommended Treatment:   • Continue to promote patient participation in therapeutic milieu  • Continue medical management per medicine  • EKG obtained, patient's QTc was 418  • Start Abilify 2 mg daily for mood and psychosis  • Continue melatonin 3 mg qhs  • Encouraged patient to rescind 72-hour notice for continued psychiatric treatment  • Continue behavioral health checks q 7 minutes  • Continue vitals per behavioral health unit protocol  • Discharge date per primary team       Risks, benefits and possible side effects of Medications:   Risks/benefits/alternativies to treatment discussed, including a myriad of potential adverse medication side effects, to which [facility] voiced understanding, agreement, and consented fully to treatment  Precilla Otoniel, DO      This note has been constructed using a voice recognition system  There may be translation, syntax,  or grammatical errors  If you have any questions, please contact the dictating provider      This note was not shared with the patient due to reasonable likelihood of causing patient harm

## 2023-03-12 NOTE — ED NOTES
3/11/2023 @ 0900:    Obtained EMTALA transport consent signature  Spoke to patient at length regarding inpatient level of care  The patient appeared tearful and nervous due to this being her first inpatient admission  This writer provided validation and emotional support  Provided parents and sister with update as well  Unit number provided

## 2023-03-12 NOTE — H&P
Aurora Hicks#  UMN:[id number] F  XML:[id number]    GRY:[id number]  Adm Date: 3/11/2023 0947  9:47 AM   ATT PHY: Giantracy Palumbo, [address]         Chief Complaint: depression, anxiety      History of Presenting Illness: Anna Reyna is a(n) 32y o  year old female who is admitted to [address] [place] [address] on voluntary 201 committment basis  Patient originally presented to [place] [address] ED on 3/10/2023 with sister for worsening depression and anxiety  Patient examined at bedside  Patient currently denies any complaints  No Known Allergies    Current Facility-Administered Medications on File Prior to Encounter   Medication Dose Route Frequency Provider Last Rate Last Admin   • [DISCONTINUED] FLUoxetine (PROzac) capsule 10 mg  10 mg Oral Daily Glenn Addison MD         Current Outpatient Medications on File Prior to Encounter   Medication Sig Dispense Refill   • FLUoxetine (PROzac) 10 mg capsule Take 10 mg by mouth daily       Active Ambulatory Problems     Diagnosis Date Noted   • No Active Ambulatory Problems     Resolved Ambulatory Problems     Diagnosis Date Noted   • No Resolved Ambulatory Problems     Past Medical History:   Diagnosis Date   • Psychiatric disorder      No past surgical history on file      Social History:   Social History     Socioeconomic History   • Marital status: Single     Spouse name: None   • Number of children: None   • Years of education: None   • Highest education level: None   Occupational History   • None   Tobacco Use   • Smoking status: Never   • Smokeless tobacco: Never   Substance and Sexual Activity   • Alcohol use: Not Currently   • Drug use: Not Currently   • Sexual activity: None   Other Topics Concern   • None   Social History Narrative   • None     Social Determinants of Health     Financial Resource Strain: Not on file   Food Insecurity: Not on file   Transportation Needs: Not on file   Physical Activity: Not on file   Stress: Not on file   Social Connections: Not on file   Intimate Partner Violence: Not on file   Housing Stability: Not on file     Family History:   Family History   Problem Relation Age of Onset   • No Known Problems Mother    • No Known Problems Father    • Lymphoma Maternal Grandmother    • Diabetes Maternal Grandmother    • Prostate cancer Maternal Grandfather      Review of Systems   Constitutional: Negative  Negative for chills and fever  HENT: Negative  Negative for ear pain and sore throat  Eyes: Negative for pain and visual disturbance  Respiratory: Negative  Negative for cough and shortness of breath  Cardiovascular: Negative  Negative for chest pain and palpitations  Gastrointestinal: Negative  Negative for abdominal pain, constipation, diarrhea, nausea and vomiting  Genitourinary: Negative  Negative for dysuria and hematuria  Musculoskeletal: Negative  Negative for arthralgias and back pain  Skin: Negative  Negative for color change and rash  Neurological: Negative  Negative for seizures and syncope  All other systems reviewed and are negative  Physical Exam   Vitals: Blood pressure 107/61, pulse 60, temperature 97 9 °F (36 6 °C), temperature source Temporal, resp  rate 16, height 5' 6" (1 676 m), weight 65 kg (143 lb 4 8 oz), SpO2 98 %  ,Body mass index is 23 13 kg/m²  Constitutional: Awake, alert, in no acute distress  Head: Normocephalic and atraumatic  Mouth/Throat: Oropharynx is clear and moist     Eyes: Conjunctivae and EOM are normal    Neck: Neck supple  No thyromegaly present  Cardiovascular: Normal rate, regular rhythm and normal heart sounds  Pulmonary/Chest: Effort normal and breath sounds normal    Abdominal: Soft  Bowel sounds are normal  She exhibits no distension  There is no tenderness  There is no rebound and no guarding  Neurological: No focal deficits  Musculoskeletal:  Nontender spine     Skin: Skin is warm and dry  No edema  [place] March is a(n) 32 y o  female with MDD  1  Allergic rhinitis  Stable  2  Insomnia  Patient is on melatonin  3  Arthralgia/headaches  Tylenol as needed  4  Psych with MDD  This is being managed by the psych team      Prognosis: Fair  Discharge Plan: In progress  Advanced Directives: I have discussed in detail with the patient the advanced directives  The patient does not have an appointed POA and does not have a living will  Patient's emergency contact is her sister, Marry Henao, whose number is [phone]  When discussing cardiac and pulmonary resuscitation efforts with the patient, the patient wishes to be FULL CODE  I have spent more than 50 minutes gathering data, doing physical examination, and discussing the advanced directives, which was witnessed by caring staff  The patient was discussed with Dr Ines Morillo and he is in agreement with the above note

## 2023-03-12 NOTE — NURSING NOTE
Patient has been sleeping without interruption all night  No s/s of distress    Monitoring continues

## 2023-03-13 RX ORDER — ERGOCALCIFEROL 1.25 MG/1
50000 CAPSULE ORAL WEEKLY
Status: DISCONTINUED | OUTPATIENT
Start: 2023-03-13 | End: 2023-03-14 | Stop reason: HOSPADM

## 2023-03-13 RX ORDER — MELATONIN
1000 DAILY
Status: DISCONTINUED | OUTPATIENT
Start: 2023-05-15 | End: 2023-03-14 | Stop reason: HOSPADM

## 2023-03-13 RX ORDER — MELATONIN
1000 DAILY
Qty: 30 TABLET | Refills: 0
Start: 2023-05-15

## 2023-03-13 RX ORDER — BISACODYL 10 MG
10 SUPPOSITORY, RECTAL RECTAL DAILY PRN
Status: DISCONTINUED | OUTPATIENT
Start: 2023-03-13 | End: 2023-03-14 | Stop reason: HOSPADM

## 2023-03-13 RX ORDER — LANOLIN ALCOHOL/MO/W.PET/CERES
3 CREAM (GRAM) TOPICAL
Qty: 30 TABLET | Refills: 0
Start: 2023-03-13

## 2023-03-13 RX ORDER — ERGOCALCIFEROL 1.25 MG/1
50000 CAPSULE ORAL WEEKLY
Qty: 9 CAPSULE | Refills: 0
Start: 2023-03-13 | End: 2023-05-16

## 2023-03-13 RX ORDER — IBUPROFEN 600 MG/1
600 TABLET ORAL EVERY 8 HOURS PRN
Qty: 90 TABLET | Refills: 0
Start: 2023-03-13

## 2023-03-13 RX ORDER — ARIPIPRAZOLE 2 MG/1
2 TABLET ORAL DAILY
Qty: 30 TABLET | Refills: 0 | Status: SHIPPED | OUTPATIENT
Start: 2023-03-14 | End: 2023-04-13

## 2023-03-13 RX ORDER — POLYETHYLENE GLYCOL 3350 17 G/17G
17 POWDER, FOR SOLUTION ORAL DAILY PRN
Status: DISCONTINUED | OUTPATIENT
Start: 2023-03-13 | End: 2023-03-14 | Stop reason: HOSPADM

## 2023-03-13 RX ADMIN — ERGOCALCIFEROL 50000 UNITS: 1.25 CAPSULE ORAL at 13:38

## 2023-03-13 RX ADMIN — Medication 3 MG: at 21:26

## 2023-03-13 RX ADMIN — ARIPIPRAZOLE 2 MG: 2 TABLET ORAL at 09:53

## 2023-03-13 NOTE — NURSING NOTE
Patient med and meal complaint visible on unit social with select peers  Patient states she is feeling better and looking forward to going home   Denies SI HI AVH anxiety or depression safety checks continue

## 2023-03-13 NOTE — ED NOTES
Insurance Authorization for admission:   Phone call placed to [employer]  Phone number: [phone]  Spoke to Nirmal LAU      (pending) days approved  Level of care: inpatient  Review on (pending)  Authorization # [id number]  This request is pending and will be reviewed tomorrow morning as per Rebecca January have been faxed to [employer] for review  E-mail to Amy Pacheco to update on status

## 2023-03-13 NOTE — PROGRESS NOTES
Progress Note - Ryan KINGSLEY Kurt 32 y o  female MRN: [id number]  Unit/Bed#: [facility] 251-01 Encounter: [id number]    Assessment/Plan   Principal Problem:    Major depressive disorder, recurrent, severe, with psychotic features      Recommended Treatment/PLAN:  Continue Abilify 2 mg  PO Q HS  For discharge tomorrow  No psychopharmacologic changes necessary at this moment; will continue to assess daily for further optimization  Continue with pharmacotherapy, group therapy, milieu therapy and occupational therapy  Continue to assess for adverse medication side effects  Encourage Mart Del to participate in nonverbal forms of therapy including journaling and art/music therapy  Continue frequent safety checks and vitals per unit protocol  Continue to engage CM/SW to assist with collateral, disposition planning, and the implementation of an individualized, patient-centered plan of care  Continue medical management by medical team   Case discussed with treatment team     Legal Status: 201  ------------------------------------------------------------    Subjective: All documentation including nursing notes, medication history to ensure medication adherence on the unit, labs, and vitals were reviewed  [facility] was evaluated this morning for continuity of care  She signed a 72 hour notice which expires tomorrow  It was HER understanding that she would only be here for 72 hours (or so she told me ) Her PCP prescribed the Prozac for her a little over a month ago, primarily for anxiety  The Prozac was not helpful for anxiety and instead, made her paranoid  Prozac was discontinued on admission and she took Abilify 2 mg  PO HS last night  Today, she reports that she slept better than she has in a very long time and that she is no longer experiencing paranoia   Her job is stressful -- she says, because of "the environment " She doesn't like being around a lot of people, which I think is part of the reason she wants to leave the hospital Lizette is not in acute distress  Over the past 24 hours per nursing report, Sarmad has been cooperative on the unit and compliant with medications  Today, Sarmad is consenting for safety on the unit  Sarmad reports feeling "much better " Sarmad notes having very good sleep  Sarmad states having a good appetite  Sarmad has been  taking the medications as prescribed and reporting no side effects  Patient was seen and examined today in the office  We discussed how she is feeling and what is on her mind, I e  going home  Sarmad denies suicidal ideations  Sarmad denies homicidal ideations  Regarding hallucinations, [place] denies auditory and visual hallucinations  PRNs overnight: none   VS: Reviewed, within normal limits    Progress Toward Goals: improvement    Psychiatric Review of Systems:  Behavior over the last 24 hours:  improved  Sleep: improved  Appetite: normal  Medication side effects: No   ROS: no complaints    Vital signs in last 24 hours:  Temp:  [97 5 °F (36 4 °C)-97 8 °F (36 6 °C)] 97 5 °F (36 4 °C)  HR:  [68-91] 91  Resp:  [16-18] 18  BP: (104-130)/(57-65) 130/65    Laboratory results:  I have personally reviewed all pertinent laboratory/tests results    Recent Results (from the past 48 hour(s))   Folate    Collection Time: 03/12/23  6:00 AM   Result Value Ref Range    Folate 18 3 (H) 3 1 - 17 5 ng/mL   Vitamin D 25 hydroxy    Collection Time: 03/12/23  6:00 AM   Result Value Ref Range    Vit D, 25-Hydroxy 23 4 (L) 30 0 - 100 0 ng/mL   Vitamin B12    Collection Time: 03/12/23  6:00 AM   Result Value Ref Range    Vitamin B-12 609 100 - 900 pg/mL   ECG 12 lead    Collection Time: 03/12/23  3:43 PM   Result Value Ref Range    Ventricular Rate 61 BPM    Atrial Rate 61 BPM    DC Interval 150 ms    QRSD Interval 86 ms    QT Interval 416 ms    QTC Interval 418 ms    P Axis 77 degrees    QRS Axis 69 degrees    T Wave Axis 80 degrees Mental Status Evaluation:    Appearance:  age appropriate, casually dressed, adequate grooming   Behavior:  cooperative, calm, guarded   Speech:  scant   Mood:  "ok"   Affect:  constricted   Thought Process:  organized   Associations: intact associations   Thought Content:  no longer paranoid   Perceptual Disturbances: Denies auditory or visual hallucinations and Does not appear to be responding to internal stimuli   Risk Potential: Suicidal ideation - None  Homicidal ideation - None  Potential for aggression - No   Sensorium:  oriented to person, place and situation   Memory:  recent and remote memory grossly intact   Consciousness:  alert and awake   Attention/Concentration: attention span and concentration are age appropriate   Insight:  limited   Judgment: fair   Gait/Station: normal gait/station   Motor Activity: no abnormal movements       Current Medications:  Current Facility-Administered Medications   Medication Dose Route Frequency Provider Last Rate   • acetaminophen  650 mg Oral Q6H PRN Char Garcia MD     • aluminum-magnesium hydroxide-simethicone  30 mL Oral Q4H PRN Char Garcia MD     • ARIPiprazole  2 mg Oral Daily Emelie Boxer, DO     • benztropine  1 mg Intramuscular Q4H PRN Max 6/day Char Garcia MD     • benztropine  1 mg Oral Q4H PRN Max 6/day Char Garcia MD     • bisacodyl  10 mg Rectal Daily PRN Char Garcia MD     • [START ON 5/15/2023] cholecalciferol  1,000 Units Oral Daily Nahomy Arzate MD     • hydrOXYzine HCL  50 mg Oral Q6H PRN Max 4/day Char Garcia MD      Or   • diphenhydrAMINE  50 mg Intramuscular Q6H PRN Char Garcia MD     • ergocalciferol  50,000 Units Oral Weekly Nahomy Arzate MD     • glycerin-hypromellose-  1 drop Both Eyes Q3H PRN Char Garcia MD     • hydrOXYzine HCL  100 mg Oral Q6H PRN Max 4/day Char Garcia MD      Or   • LORazepam  2 mg Intramuscular Q6H PRN Char Garcia MD     • hydrOXYzine HCL  25 mg Oral Q6H PRN Max 4/day Char Garcia MD • ibuprofen  400 mg Oral Q6H PRN Jenni Rodriguez MD     • ibuprofen  600 mg Oral Q8H PRN Jenni Rodriguez MD     • melatonin  3 mg Oral HS Jenni Rodriguez MD     • mirtazapine  7 5 mg Oral HS PRN Jenni Rodriguez MD     • OLANZapine  5 mg Oral Q4H PRN Max 3/day Jenni Rodriguez MD      Or   • OLANZapine  2 5 mg Intramuscular Q4H PRN Max 3/day Jenni Rodriguez MD     • OLANZapine  5 mg Oral Q3H PRN Max 3/day Jenni Rodriguez MD      Or   • OLANZapine  5 mg Intramuscular Q3H PRN Max 3/day Jenni Rodriguez MD     • OLANZapine  2 5 mg Oral Q4H PRN Max 6/day Jenni Rodriguez MD     • polyethylene glycol  17 g Oral Daily PRN Jenni Rodriguez MD     • propranolol  10 mg Oral Q8H PRN Jenni Rodriguez MD     • senna-docusate sodium  1 tablet Oral Daily PRN Jenni Rodriguez MD         Suicide/Homicide Risk Assessment:  Risk of Harm to Self:   Based on today's assessment, [facility] presents the following risk of harm to self: low    Risk of Harm to Others:  Based on today's assessment, [facility] presents the following risk of harm to others: low    The following interventions are recommended: behavioral checks every 7 minutes, continued hospitalization on locked unit    Behavioral Health Medications: All current active meds have been reviewed  Risks, benefits and possible side effects of Medications:   Risks, benefits, and possible side effects of medications explained to patient and patient verbalizes understanding  Counseling / Coordination of Care:  Patient's progress discussed with staff in treatment team meeting  Medications, treatment progress and treatment plan reviewed with patient  Millie Castleman, MD 03/13/23      This note was completed in part utilizing [employer] Direct Software  Grammatical, translation, syntax errors, random word insertions, spelling mistakes, and incomplete sentences may be an occasional consequence of this system secondary to software limitations with voice recognition, ambient noise, and hardware issues   If you have any questions or concerns about the content, text, or information contained within the body of this dictation, please contact the provider for clarification

## 2023-03-13 NOTE — PROGRESS NOTES
03/13/23 1251   Team Meeting   Meeting Type Tx Team Meeting   Team Members Present   Team Members Present Physician;Nurse;   Physician Team Member Dr Missy Manzanares MD   Nursing Team Member Rigoberto Phillip RN   Social Work Team Member Asia Ferguson   Patient/Family Present   Patient Present Yes   Patient's Family Present No     Patient and treatment team met and reviewed pt strengths, limitations, coping skills, treatment plan and goals; pt agreeable and signed; copy on chart

## 2023-03-13 NOTE — PROGRESS NOTES
03/13/23 0730   Activity/Group Checklist   Group   (Morning Meeting and Coffee)   Attendance Attended   Attendance Duration (min) 31-45   Interactions   (minimal interaction when prompted)   Affect/Mood Blunted/flat;Calm   Goals Achieved Able to listen to others; Able to engage in interactions

## 2023-03-13 NOTE — PROGRESS NOTES
03/13/23   Team Meeting   Meeting Type Daily Rounds   Team Members Present   Team Members Present Physician;Nurse;   Physician Team Member Dr Karthikeyan Bower MD; Trevor Acharya; Dr Daisy Maciel MD   Nursing Team Member Mir Blue, MARIA E; Sushma Reza, MARIA E   Social Work Team Member Mary Cummins, [place]   Patient/Family Present   Patient Present No   Patient's Family Present No     New 201, singed 72hr 3/11 1425, withdrawn, denies all psych anx, Abilify 2mg started, no PRNs, slept

## 2023-03-13 NOTE — PROGRESS NOTES
Progress Note - Britni Quesada 32 y o  female MRN: [id number]    Unit/Bed#: [facility] 251-01 Encounter: [id number]        Subjective:   Patient seen and examined at bedside after reviewing the chart and discussing the case with the caring staff  Patient examined at bedside  Patient has no acute complaints  On review of patient's labs it was found that patient's vitamin D level was low 23 4 but B12 level was normal 619  Patient is a scheduled for discharge tomorrow 3/14/2023  Patient is requesting all her prescriptions  I reviewed and reconciled patient's problem list and medications  Physical Exam   Vitals: Blood pressure 130/65, pulse 91, temperature 97 5 °F (36 4 °C), temperature source Temporal, resp  rate 18, height 5' 6" (1 676 m), weight 65 kg (143 lb 4 8 oz), SpO2 98 %  ,Body mass index is 23 13 kg/m²  Constitutional: Patient in no acute distress  HEENT: PERR, EOMI, MMM  Cardiovascular: Normal rate and regular rhythm  Pulmonary/Chest: Effort normal and breath sounds normal    Abdomen: Non distended  Assessment/Plan:  Britni Quesada is a(n) 32 y o  female with MDD      Medical clearance  Patient is medically cleared for discharge  All scripts will be sent out for the patient  1  Allergic rhinitis  Stable  2  Insomnia  Patient is on melatonin  3  Arthralgia/headaches  Tylenol as needed  4   New vitamin D deficiency  I will put the patient on vitamin D bolus doses for 10 weeks followed by vitamin D3 1000 units daily

## 2023-03-13 NOTE — TREATMENT PLAN
TREATMENT PLAN REVIEW - [address] Kurt 32 y o  1995 female MRN: [id number]    [facility] [address] [facility] Room / Bed: [place] [address]/[facility] 033-71 Encounter: [id number]          Admit Date/Time:  3/11/2023  9:47 AM    Treatment Team: Attending Provider: Sharmin Martinez MD; Consulting Physician: Maegan Alejandra PA-C; Patient Care Assistant: Candace Camacho; : Kita Mcgee; Patient Care Assistant: Anahi Deleon; Care Manager: Macey Michele RN; Licensed Practical Nurse:  Nile Crawford LPN; Recreational Therapist: Raman Pino    Diagnosis: Principal Problem:    Major depressive disorder, recurrent, severe, with psychotic features      Patient Strengths/Assets: ability for insight, average or above intelligence, cooperative, compliant with medication, family ties, financially secure, general fund of knowledge, good physical health, negotiates basic needs, patient is on a voluntary commitment, reasoning ability, stable/recent employment, supportive family/friends, work skills    Patient Barriers/Limitations: limited motivation, low self esteem, poor insight, poor interpersonal skills, poor past treatment response    Short Term Goals: decrease in depressive symptoms, decrease in anxiety symptoms, decrease in paranoid thoughts, improvement in insight, sleep improvement, improvement in appetite, mood stabilization, increase in socialization with peers on the unit, acceptance of need for psychiatric treatment, acceptance of psychiatric medications    Long Term Goals: resolution of depressive symptoms, resolution of psychotic symptoms, improved insight, acceptance of need for psychiatric medications, acceptance of need for psychiatric treatment, acceptance of need for psychiatric follow up after discharge, acceptance of psychiatric diagnosis, adequate sleep, adequate appetite, appropriate interaction with peers, appropriate interaction with family, stable living arrangements upon discharge, establishment of family support upon discharge    Progress Towards Goals: starting psychiatric medications as prescribed, continue psychiatric medications as prescribed, improving, participates in milieu therapy, mood is stabilizing, reality testing is improving, less anxious, less depressed, less paranoid, discharge planning    Recommended Treatment: medication management, patient medication education, group therapy, milieu therapy, continued Behavioral Health psychiatric evaluation/assessment process    Treatment Frequency: daily medication monitoring, group and milieu therapy daily, monitoring through interdisciplinary rounds, monitoring through weekly patient care conferences    Expected Discharge Date:  03/14/23    Discharge Plan: referral for outpatient medication management with a psychiatrist, referral for outpatient psychotherapy, return to previous living arrangement    Treatment Plan Created/Updated By: Tien Barbosa MD

## 2023-03-13 NOTE — NURSING NOTE
Patient was withdrawn to room this evening  Patient denied S/I, H/I, AV/H or depression  The patient did endorse anxiety related to wanting to go home  Patient stated she is doing better and that she is going to be more motivated and productive upon discharge home  Patient stated she is feeling more tired than usual after receiving her first dose of Abilify today

## 2023-03-13 NOTE — DISCHARGE INSTR - OTHER ORDERS
You are being discharged to [facility] [address]  Phone: [phone]  Triggers you have identified during your hospitalization that led to your admission of a distressed mood includes ineffective coping skills and adverse reaction to medication  Coping skills you have identified during your hospitalization include exercise and having alone time  If you are unable to deal with your distressed mood alone please contact Lewisburgeric Uribeabbichristina (Sister) [phone]  If that is not effective and you continue to have a distressed mood, are overwhelmed, or in crisis, please contact (Crisis #) [facility] 67 [phone] R4841211, dial 911 or go to the nearest emergency center  [facility] Crisis Hotline: Jeremy Tse Suicide Prevention Lifeline:  6-894.958.2067  *Alcohol Anonymous: 159.719.5550  *[place] [facility] Alcohol Commission: (656) 750-3363  [address] [facility]  on [address] [place] [address] ([facility]) HELPLINE: 276.322.2314/Website: www dariel org  *Substance Abuse and [facility]) [employer], which is a confidential, free, 24-hour-a-day, 365-day-a-year, information service for individuals and family members facing mental health and/or substance use disorders  This service provides referrals to local treatment facilities, support groups, and community-based organizations  Callers can also order free publications and other information  Call 2-999.992.4131/Website: www [facility] gov  *[facility] 2-1-1: This is a toll free, confidential, 24-hour-a-day service which connects you to a community  in your area who can help you find services and resources that are available to you locally and provide critical services that can improve and save lives  Call: 211  /Website: https://nickiBoomlagoonlandon net/     You declined a follow up primary care provider appointment at this time       Sachi Stewart or Brittany, our Steffanie and Maria, will be calling you after your discharge, on the phone number that you provided  They will be available as an additional support, if needed  If you wish to speak with one of them, you may contact Deidre Batres at [phone] or Taylor Rogers at [phone]    You are being discharged to

## 2023-03-13 NOTE — SOCIAL WORK
Sw met with pt in small group room to complete psychosocial, ROIs  Pt presents calm, cooperative, denies all psych symptoms except dep 2/10  Stressors: adverse reaction to Prozac one month ago causing paranoia  Coping skills: exercise, alone time  Strengths: makes basic needs, known, family ties, cooperative  Pt denies hx AIP, OP tx for MH  Pt denies SA, HI, violence towards others in last 12 months  Pt endorse SI 2x in last yr without plan  Pt denies access to firearms  Pt denies hx abuse, trauma  Pt denies family hx mental illness, SI, HI, substance abuse  Pt endorses hx tobacco vape, but denies all current substance use  Pt denies legal issues  Pt is single, heterosexual female with no children, pets being care for by parents  Pt lives with parents, is able to return home, endorses positive relationships with parents, sister Brooklynn Alba  Pt graduated HS 2011, has [facility] from [place], makes about $2100/month in income as  at [employer]  Pt pro  hx  Pt denies assistive devices, physical barriers in the home  Pt denies religous, cultural needs on unit  Pt has 's license, car  Pt denies current POA, guardianship, advanced directives  Pharmacy: [facility]  Pt agreeable to psych Op follow up, declined pcp referral      NATHAN signed  Roque Schwab (Sister) [phone] notified of pt's admission, pts scheduled dc tomorrow, will pick pt up tomorrow at 10am, confirms parents firearms are always locked up in secured safe   Call ended mutually  Sw called Selvin Rogel [phone], spoke to Jose to schedule med mgmt appt: 3/27 10am with Theresa Stevenson, in person

## 2023-03-13 NOTE — SOCIAL WORK
Sw met with pt in pt's room, pt is agreeable to dc tomorrow to sister at 10am, agreeable to [facility] intake for med mgmt  Pt voiced no concerns related to dc, feels improved, ready to dc

## 2023-03-13 NOTE — PROGRESS NOTES
03/13/23 1100   Activity/Group Checklist   Group Admission/Discharge   Attendance Attended   Attendance Duration (min) 16-30   Interactions Interacted appropriately   Affect/Mood Appropriate   Goals Achieved Identified feelings; Identified triggers; Identified relapse prevention strategies; Discussed coping strategies; Discussed discharge plans; Identified resources and support systems; Able to listen to others; Able to engage in interactions; Able to reflect/comment on own behavior;Able to recieve feedback; Able to self-disclose     Patient agreeable to meet and complete her self assessment and relapse prevention plan  Patient shared she is here due to anxiety; she started medication 1 month ago and began to feel paranoid and then became anxious  Patient is hopeful to get into therapy at discharge  Patient shared that her current job is a stressor  Patient wants to feel more motivated at time of discharge  Patient shared likes of reading, music, pets, baking and cooking

## 2023-03-13 NOTE — PLAN OF CARE
Problem: DISCHARGE PLANNING - CARE MANAGEMENT  Goal: Discharge to post-acute care or home with appropriate resources  Description: INTERVENTIONS:  - Conduct assessment to determine patient/family and health care team treatment goals, and need for post-acute services based on payer coverage, community resources, and patient preferences, and barriers to discharge  - Address psychosocial, clinical, and financial barriers to discharge as identified in assessment in conjunction with the patient/family and health care team  - Arrange appropriate level of post-acute services according to patient’s   needs and preference and payer coverage in collaboration with the physician and health care team  - Communicate with and update the patient/family, physician, and health care team regarding progress on the discharge plan  - Arrange appropriate transportation to post-acute venues  Outcome: Progressing     New 201, no dc date at this time

## 2023-03-13 NOTE — PLAN OF CARE
Problem: ANXIETY  Goal: Will report anxiety at manageable levels  Description: INTERVENTIONS:  - Administer medication as ordered  - Teach and encourage coping skills  - Provide emotional support  - Assess patient/family for anxiety and ability to cope  Outcome: Progressing  Goal: By discharge: Patient will verbalize 2 strategies to deal with anxiety  Description: Interventions:  - Identify any obvious source/trigger to anxiety  - Staff will assist patient in applying identified coping technique/skills  - Encourage attendance of scheduled groups and activities  Outcome: Progressing   See chart note

## 2023-03-14 VITALS
WEIGHT: 143.3 LBS | SYSTOLIC BLOOD PRESSURE: 113 MMHG | BODY MASS INDEX: 23.03 KG/M2 | RESPIRATION RATE: 18 BRPM | OXYGEN SATURATION: 100 % | HEIGHT: 66 IN | HEART RATE: 70 BPM | TEMPERATURE: 97.7 F | DIASTOLIC BLOOD PRESSURE: 58 MMHG

## 2023-03-14 PROBLEM — F33.9 RECURRENT MAJOR DEPRESSIVE DISORDER (HCC): Status: RESOLVED | Noted: 2023-03-11 | Resolved: 2023-03-14

## 2023-03-14 RX ADMIN — ARIPIPRAZOLE 2 MG: 2 TABLET ORAL at 08:43

## 2023-03-14 NOTE — DISCHARGE SUMMARY
Discharge Summary - Catrachita Torrez [age] y o  female MRN: [id number]  Unit/Bed#: -01 Encounter: [id number]     Admission Date: 3/11/2023         Discharge Date: 3/14/2023 10:00 AM    Attending Psychiatrist:  Dr Robina Longoria    Reason for Admission/HPI: Depression [F32  A]      According to H&P by Dr Juan Carlin 3/11/23:    History of Present Illness      Asia Wynn is a 32 y o  female who presents voluntarily via a 201 for depression  Patient initially presented to the [facility] emergency department  Per crisis worker Paz Chaparro note on 3/10/23: "Patient presented to ED due to: anxiety, suicidal thoughts, depression  Patient calm and cooperative  Patient oriented x4  Patient currently lives with both parents  Patient currently works full time  Patient accompanied by the sister  Crisis spoke with patient alone  Patient reports getting Covid a month ago  Patients states she was unable to breath and developed panic attacks  Patient states she went to her PCP and it was suggested she sees psychiatrist  Patient did see psychiatrist who prescribed her Prozac  Patient reports situation got worse  Patient reports intrusive suicidal thoughts and is unable to complete daily tasks  Patient reports she is unable to focus, experiencing high anxiety while at work and at home  Patient states "I am crazy"  Patient very tearful, whispering like voice  Patient unable to contract for safety, asking for help  Patient denies trauma, but reports frequent parental yelling experience as child  Patient denies HI  Patient denies self-harming  Patient denies hallucinations  Patient reports lack of appetite  Patient reports lack of sleep  Patient sister reports patient not herself for about month with increase in anxiety, paranoia that other people lie to patient and/or talking about the patient  Sister states patient was unable to work today stating "I am getting crazy, I am crazy"     Crisis discussed treatment option, patient willing to sign 201 "     [facility] states she has had a history of depression and anxiety for years  Reports that her symptoms have been consistent  Reports that in January she was diagnosed with COVID  Reports that when she returned back to work she had a panic attack  She reports that she was referred to a psychiatrist and that she was started on Prozac  She reports that over the following weeks she felt that she had some improvement in anxiety and felt that she had a "good mood" more often  Reports that for the past week she has had an increase in paranoia  Reports that she recently saw her psychiatrist on Thursday and states that she did not tell him this at the time  States that she did not do this because she did not "realize" that it was paranoia at the time  She reports that prior to presentation to the emergency department she had felt paranoid at work, had contacted her sister, who she states brought her to the emergency department      Patient endorses symptomatology of depression including low mood, decreased sleep, decreased appetite, reports few pound weight loss, decreased interest   Reports adequate motivation and energy  Patient reports having crying spells  She reports recent suicidal ideation  Denies current suicidal or homicidal ideation, plan, or intent  She denies auditory or visual hallucinations  She discussed her paranoia more stating that she feels paranoid about "everyone"  Reports that there is a secret being held from her  Reports that she feels that people are watching her and spying on her  Reports feeling that her phone is being monitored  Patient appears to be guarded and paranoid, made limited eye contact  Patient initially gave an unclear response when asked if she experiences thought broadcasting/thought insertion and related it to her paranoia    After clarifying this patient did not seem to endorse thought broadcasting or thought insertion, and did not endorse ideas of reference  She does not endorse any current or previous episodes of kyle/hypomania  She reports that she recently had stopped taking Prozac and that she does not want to take this medication again  Reports being interested in starting a new medication to improve her symptoms  Hospital Course: The patient was admitted to the inpatient psychiatric unit and started on every 7 minutes precautions  During the hospitalization the patient was attending individual therapy, group therapy, milieu therapy and occupational therapy  Psychiatric medications were titrated over the hospital stay to address depression, paranoid ideation, anxiety symptoms and suicidal ideation  Sarmad was started on antipsychotic medication Abilify and hypnotic medication Melatonin  Prior to beginning of treatment medications risks and benefits and possible side effects including risk of parkinsonian symptoms, Tardive Dyskinesia and metabolic syndrome related to treatment with antipsychotic medications, risk of cardiovascular events in elderly related to treatment with antipsychotic medications and risk of impaired next-day mental alertness, complex sleep-related behavior and dependence related to treatment with hypnotic medications were reviewed with the patient  Sarmad verbalized understanding and agreement for treatment  Shortly after initial encounter with psychiatrist, patient signed 72-hour notice (3/11/23 @ [phone]) and added to nurse that this is not, "what I need "  As the weekend progressed, patient was mildly guarded, but reported feeling "better and calmer "  No longer endorsing SI and reported improvement with anxiety and depression  She was no longer endorsing paranoia and believed the initiation of Prozac is "what made me paranoid "    During Lizette's short stay in the hospital, she has been compliant with medications and verbalize no SI or paranoid content    She also reported improvement with sleep, mood, anxiety, and depression  Tolerating Abilify well with the exception of mild fatigue  Upon expiration of 72-hour notice, Sarmad exhibited no signs or symptoms of acute psychosis, kyle, or hypomania and adamantly denied SI/HI  She was also tolerating medications well and not reporting any significant side effects at time of discharge   Prior to discharge  spoke with Lizette's sister Julien Mccarthy) to address support and Lizette's readiness for discharge  [place] confirmed that all guns were securely locked at home  [place] also agreed to provide transport at time of discharge  Sarmad also verbalized an adequate safety plan to utilize in time of crisis should she become a danger to herself or others  This plan includes talking with her sister, utilizing crisis hotline, or returning to the nearest emergency department  The outpatient follow up with [facility] for psychiatric intake on 3/27/23 @ 10AM was arranged by the unit  upon discharge  Sarmad signed 72 hour notice and requested discharge  At the time of the 72 hour notice expiration she had no criteria for involuntary commitment and denied any suicidal or homicidal ideation      Mental Status at time of Discharge:     Appearance:  age appropriate, casually dressed and Well-groomed   Behavior:  Cooperative, calm, mildly guarded   Speech:  soft   Mood:  "calmer"   Affect:  constricted   Thought Process:  goal directed   Thought Content:  No overt delusions or paranoia verbalized   Perceptual Disturbances: Denied AVH, did not appear internally preoccupied   Risk Potential: Suicidal Ideations none, Homicidal Ideations none and Potential for Aggression No   Sensorium:  person, place, time/date and situation   Cognition:  recent and remote memory grossly intact   Consciousness:  alert and awake    Attention: attention span and concentration were age appropriate   Insight:  limited   Judgment: limited Gait/Station: normal gait/station and normal balance   Motor Activity: no abnormal movements     Admission Diagnosis:Depression [F32  A]    Discharge Diagnosis:   Principal Problem (Resolved): Major depressive disorder, recurrent, severe, with psychotic features  Active Problems:    * No active hospital problems   *    Lab results:  Admission on 03/11/2023, Discharged on 03/14/2023   Component Date Value   • Folate 03/12/2023 18 3 (H)    • Vit D, 25-Hydroxy 03/12/2023 23 4 (L)    • Vitamin B-12 03/12/2023 609    • Ventricular Rate 03/12/2023 61    • Atrial Rate 03/12/2023 61    • NE Interval 03/12/2023 150    • QRSD Interval 03/12/2023 86    • QT Interval 03/12/2023 416    • QTC Interval 03/12/2023 418    • P Axis 03/12/2023 77    • QRS Axis 03/12/2023 69    • T Wave Axis 03/12/2023 80      Discharge Medications:  Discharge Medication List as of 3/14/2023  8:32 AM      START taking these medications    Details   ARIPiprazole (ABILIFY) 2 mg tablet Take 1 tablet (2 mg total) by mouth daily Do not start before March 14, 2023 , Starting Tue 3/14/2023, Until Thu 4/13/2023, Normal      cholecalciferol (VITAMIN D3) 1,000 units tablet Take 1 tablet (1,000 Units total) by mouth daily Do not start before May 15, 2023 , Starting Mon 5/15/2023, No Print      ergocalciferol (VITAMIN D2) 50,000 units Take 1 capsule (50,000 Units total) by mouth once a week for 10 doses, Starting Mon 3/13/2023, Until Tue 5/16/2023, No Print      ibuprofen (MOTRIN) 600 mg tablet Take 1 tablet (600 mg total) by mouth every 8 (eight) hours as needed for headaches, moderate pain or mild pain, Starting Mon 3/13/2023, No Print      melatonin 3 mg Take 1 tablet (3 mg total) by mouth daily at bedtime, Starting Mon 3/13/2023, No Print            Discharge Medication List as of 3/14/2023  8:32 AM      STOP taking these medications       FLUoxetine (PROzac) 10 mg capsule Comments:   Reason for Stopping:              Discharge Medication List as of 3/14/2023  8:32 AM         Discharge Medication List as of 3/14/2023  8:32 AM         Discharge instructions/Information to patient and family:   See after visit summary for information provided to patient and family  Provisions for Follow-Up Care:  See after visit summary for information related to follow-up care and any pertinent home health orders  Discharge Statement:    I spent 25 minutes discharging the patient  This time was spent on the day of discharge  I had direct contact with the patient on the day of discharge  I reviewed with [facility] importance of compliance with medications and outpatient treatment after discharge  I discussed the medication regimen and possible side effects of the medications with [facility] prior to discharge  At the time of discharge she was tolerating psychiatric medications  I discussed outpatient follow up with [facility]  I reviewed with [facility] crisis plan and safety plan upon discharge  [facility] agreed to abstain from drug and alcohol use after discharge  [facility] signed 72 hour notice and requested discharge  At the time of the 72 hour notice expiration she had no criteria for involuntary commitment and denied any suicidal or homicidal ideation      2 ÁNGELA Nails 03/14/23

## 2023-03-14 NOTE — PROGRESS NOTES
03/14/23   Team Meeting   Meeting Type Daily Rounds   Team Members Present   Team Members Present Physician;Nurse;   Physician Team Member Dr Freddie Jamil MD; Juan A Chandler; Dr Ese Ruiz MD   Nursing Team Member Claudia Nieves RN; Leila Garcia RN   Social Work Team Member [place], [place]   Patient/Family Present   Patient Present No   Patient's Family Present No     DC today 10am to family, [facility] follow up med mgmt 3/27

## 2023-03-14 NOTE — NURSING NOTE
Patient withdrawn to room but awake for 7-11 shift  During assessment she presents as flat and anxious, somewhat delayed in responses with poor eye contact  She reports having increased anxiety/panic prior to admissions related to medication she was on, states that she only recently sought psychiatric help after having Covid and being out of work, denies other stressors  Somewhat scant but appropriate  Patient states she is feeling better after medication change  She denies pain, anxiety, depression, denies questions or apprehensions, states she will be following up with [facility] after discharge  No other needs identified  Will remain on safety precautions and continual monitoring

## 2023-03-14 NOTE — PLAN OF CARE
Problem: ANXIETY  Goal: Will report anxiety at manageable levels  Description: INTERVENTIONS:  - Administer medication as ordered  - Teach and encourage coping skills  - Provide emotional support  - Assess patient/family for anxiety and ability to cope  Outcome: Progressing   See chart note

## 2023-03-14 NOTE — PROGRESS NOTES
03/13/23 1000   Activity/Group Checklist   Group   (Creative Expression of Feelings and Emotions)   Attendance Attended   Attendance Duration (min) Greater than 60   Interactions Interacted appropriately   Affect/Mood Appropriate;Calm   Goals Achieved Identified feelings; Discussed coping strategies; Identified resources and support systems; Able to listen to others; Able to engage in interactions; Able to reflect/comment on own behavior;Able to manage/cope with feelings

## 2023-03-14 NOTE — PROGRESS NOTES
Progress Note - Adenike Torres 32 y o  female MRN: [id number]    Unit/Bed#: [facility] 251-01 Encounter: [id number]        Subjective:   Patient seen and examined at bedside after reviewing the chart and discussing the case with the caring staff  Patient examined at bedside  Patient has no acute complaints  Patient is being discharged today  Patient is requesting all her prescriptions  I reviewed and reconciled patient's problem list and medications  Physical Exam   Vitals: Blood pressure 113/58, pulse 70, temperature 97 7 °F (36 5 °C), temperature source Temporal, resp  rate 18, height 5' 6" (1 676 m), weight 65 kg (143 lb 4 8 oz), SpO2 100 %  ,Body mass index is 23 13 kg/m²  Constitutional: Patient in no acute distress  HEENT: PERR, EOMI, MMM  Cardiovascular: Normal rate and regular rhythm  Pulmonary/Chest: Effort normal and breath sounds normal    Abdomen: Non distended  Assessment/Plan:  Adenike Torres is a(n) 32 y o  female with MDD      Medical clearance  Patient is medically cleared for discharge  All scripts will be sent out for the patient  1  Allergic rhinitis  Stable  2  Insomnia  Patient is on melatonin  3  Arthralgia/headaches  Tylenol as needed  4   New vitamin D deficiency  I will put the patient on vitamin D bolus doses for 10 weeks followed by vitamin D3 1000 units daily

## 2023-03-14 NOTE — NURSING NOTE
Pt discharged with the following belongings:    Pt Room:    2 x black shirts  2 x gray shirts  1 x black long sleeve  2 x gray leggings  1 x green sweater  1 x black sweater  1 x green leggings  1 x white slippers  1 x black slip on shoes  3 x black socks  2 x gray socks  1 x white socks    Storage:    2 x gray biker shorts  1 x black biker shorts  2 x gray tank top  1 x pink top  1 x sweater  2 x bras  6 x underwear  1 x rainbow slippers    Hygiene closet:    1 x brush  1 x deodorant    Went over with pt & signed

## 2023-03-14 NOTE — PROGRESS NOTES
03/14/23 0730   Activity/Group Checklist   Group   (Morning Meeting and Coffee)   Attendance Attended   Attendance Duration (min) 46-60   Interactions Interacted appropriately   Affect/Mood Appropriate   Goals Achieved Identified feelings; Discussed coping strategies; Able to listen to others; Able to engage in interactions; Able to reflect/comment on own behavior;Able to manage/cope with feelings

## 2023-03-14 NOTE — BH TRANSITION RECORD
Contact Information: If you have any questions, concerns, pended studies, tests and/or procedures, or emergencies regarding your inpatient behavioral health visit  Please contact [facility] behavioral health unit [phone] and ask to speak to a , nurse or physician  A contact is available 24 hours/ 7 days a week at this number  Summary of Procedures Performed During your Stay:  Below is a list of major procedures performed during your hospital stay and a summary of results:  - No major procedures performed  Pending Studies (From admission, onward)    None        Please follow up on the above pending studies with your PCP and/or referring provider

## 2023-03-14 NOTE — NURSING NOTE
Patient discharged to home without incident  Patient verbalized understanding of all discharge instructions and medications  Patient denies any suicidal ideations  Safety plan reviewed and reinforced  All belongings accounted for and returned to patient

## 2023-03-27 ENCOUNTER — HOSPITAL ENCOUNTER (EMERGENCY)
Facility: HOSPITAL | Age: 28
End: 2023-03-27
Attending: EMERGENCY MEDICINE

## 2023-03-27 VITALS
HEART RATE: 85 BPM | TEMPERATURE: 98 F | SYSTOLIC BLOOD PRESSURE: 125 MMHG | OXYGEN SATURATION: 98 % | RESPIRATION RATE: 16 BRPM | BODY MASS INDEX: 22.6 KG/M2 | WEIGHT: 140 LBS | DIASTOLIC BLOOD PRESSURE: 55 MMHG

## 2023-03-27 DIAGNOSIS — R45.851 SUICIDAL IDEATION: Primary | ICD-10-CM

## 2023-03-27 DIAGNOSIS — F32.A DEPRESSION: ICD-10-CM

## 2023-03-27 LAB
AMPHETAMINES SERPL QL SCN: NEGATIVE
BARBITURATES UR QL: NEGATIVE
BENZODIAZ UR QL: NEGATIVE
COCAINE UR QL: NEGATIVE
ETHANOL EXG-MCNC: 0 MG/DL
EXT PREGNANCY TEST URINE: NEGATIVE
EXT. CONTROL: NORMAL
FLUAV RNA RESP QL NAA+PROBE: NEGATIVE
FLUBV RNA RESP QL NAA+PROBE: NEGATIVE
METHADONE UR QL: NEGATIVE
OPIATES UR QL SCN: NEGATIVE
OXYCODONE+OXYMORPHONE UR QL SCN: NEGATIVE
PCP UR QL: NEGATIVE
RSV RNA RESP QL NAA+PROBE: NEGATIVE
SARS-COV-2 RNA RESP QL NAA+PROBE: NEGATIVE
THC UR QL: NEGATIVE

## 2023-03-27 RX ORDER — DIPHENHYDRAMINE HYDROCHLORIDE 50 MG/ML
50 INJECTION INTRAMUSCULAR; INTRAVENOUS EVERY 6 HOURS PRN
Status: CANCELLED | OUTPATIENT
Start: 2023-03-27

## 2023-03-27 RX ORDER — AMOXICILLIN 250 MG
1 CAPSULE ORAL DAILY PRN
Status: CANCELLED | OUTPATIENT
Start: 2023-03-27

## 2023-03-27 RX ORDER — HYDROXYZINE 50 MG/1
100 TABLET, FILM COATED ORAL
Status: CANCELLED | OUTPATIENT
Start: 2023-03-27

## 2023-03-27 RX ORDER — POLYETHYLENE GLYCOL 3350 17 G/17G
17 POWDER, FOR SOLUTION ORAL DAILY PRN
Status: CANCELLED | OUTPATIENT
Start: 2023-03-27

## 2023-03-27 RX ORDER — TRAZODONE HYDROCHLORIDE 50 MG/1
50 TABLET ORAL
Status: CANCELLED | OUTPATIENT
Start: 2023-03-27

## 2023-03-27 RX ORDER — OLANZAPINE 2.5 MG/1
2.5 TABLET ORAL
Status: CANCELLED | OUTPATIENT
Start: 2023-03-27

## 2023-03-27 RX ORDER — BENZTROPINE MESYLATE 0.5 MG/1
1 TABLET ORAL
Status: CANCELLED | OUTPATIENT
Start: 2023-03-27

## 2023-03-27 RX ORDER — OLANZAPINE 2.5 MG/1
10 TABLET ORAL
Status: CANCELLED | OUTPATIENT
Start: 2023-03-27

## 2023-03-27 RX ORDER — IBUPROFEN 400 MG/1
800 TABLET ORAL EVERY 8 HOURS PRN
Status: CANCELLED | OUTPATIENT
Start: 2023-03-27

## 2023-03-27 RX ORDER — OLANZAPINE 2.5 MG/1
5 TABLET ORAL
Status: CANCELLED | OUTPATIENT
Start: 2023-03-27

## 2023-03-27 RX ORDER — MAGNESIUM HYDROXIDE/ALUMINUM HYDROXICE/SIMETHICONE 120; 1200; 1200 MG/30ML; MG/30ML; MG/30ML
30 SUSPENSION ORAL EVERY 4 HOURS PRN
Status: CANCELLED | OUTPATIENT
Start: 2023-03-27

## 2023-03-27 RX ORDER — HYDROXYZINE 50 MG/1
50 TABLET, FILM COATED ORAL
Status: CANCELLED | OUTPATIENT
Start: 2023-03-27

## 2023-03-27 RX ORDER — IBUPROFEN 400 MG/1
400 TABLET ORAL EVERY 4 HOURS PRN
Status: CANCELLED | OUTPATIENT
Start: 2023-03-27

## 2023-03-27 RX ORDER — ARIPIPRAZOLE 2 MG/1
2 TABLET ORAL DAILY
Status: CANCELLED | OUTPATIENT
Start: 2023-03-27

## 2023-03-27 RX ORDER — LORAZEPAM 1 MG/1
0.5 TABLET ORAL EVERY 6 HOURS PRN
Status: CANCELLED | OUTPATIENT
Start: 2023-03-27

## 2023-03-27 RX ORDER — BENZTROPINE MESYLATE 1 MG/ML
1 INJECTION INTRAMUSCULAR; INTRAVENOUS
Status: CANCELLED | OUTPATIENT
Start: 2023-03-27

## 2023-03-27 RX ORDER — OLANZAPINE 10 MG/1
10 INJECTION, POWDER, LYOPHILIZED, FOR SOLUTION INTRAMUSCULAR
Status: CANCELLED | OUTPATIENT
Start: 2023-03-27

## 2023-03-27 RX ORDER — OLANZAPINE 10 MG/1
5 INJECTION, POWDER, LYOPHILIZED, FOR SOLUTION INTRAMUSCULAR
Status: CANCELLED | OUTPATIENT
Start: 2023-03-27

## 2023-03-27 RX ORDER — BISACODYL 10 MG
10 SUPPOSITORY, RECTAL RECTAL DAILY PRN
Status: CANCELLED | OUTPATIENT
Start: 2023-03-27

## 2023-03-27 RX ORDER — LANOLIN ALCOHOL/MO/W.PET/CERES
3 CREAM (GRAM) TOPICAL
Status: CANCELLED | OUTPATIENT
Start: 2023-03-27

## 2023-03-27 RX ORDER — HYDROXYZINE 50 MG/1
25 TABLET, FILM COATED ORAL
Status: CANCELLED | OUTPATIENT
Start: 2023-03-27

## 2023-03-27 RX ORDER — LORAZEPAM 2 MG/ML
2 INJECTION INTRAMUSCULAR EVERY 6 HOURS PRN
Status: CANCELLED | OUTPATIENT
Start: 2023-03-27

## 2023-03-27 NOTE — ED PROVIDER NOTES
History  Chief Complaint   Patient presents with   • Suicidal     For about a month, has been seen at the outpatient clinic  '       HPI  Is a 18-year-old female with history of depression anxiety and suicidal ideation sent from [facility] for suicidal ideation and admission  Patient states that she been suicidal for over a month but has been progressing getting worse  She was seen at the EP clinic today who sent her to the emergency department for admission  She states she is having paranoid thoughts that she is not able to trust people and people are lying to her  She also states that that she wants to drive herself off a [place]  Patient is tearful in the ED denies any HI or hallucination  Prior to Admission Medications   Prescriptions Last Dose Informant Patient Reported? Taking? ARIPiprazole (ABILIFY) 2 mg tablet   No No   Sig: Take 1 tablet (2 mg total) by mouth daily Do not start before March 14, 2023  cholecalciferol (VITAMIN D3) 1,000 units tablet   No No   Sig: Take 1 tablet (1,000 Units total) by mouth daily Do not start before May 15, 2023  ergocalciferol (VITAMIN D2) 50,000 units   No No   Sig: Take 1 capsule (50,000 Units total) by mouth once a week for 10 doses   ibuprofen (MOTRIN) 600 mg tablet   No No   Sig: Take 1 tablet (600 mg total) by mouth every 8 (eight) hours as needed for headaches, moderate pain or mild pain   melatonin 3 mg   No No   Sig: Take 1 tablet (3 mg total) by mouth daily at bedtime      Facility-Administered Medications: None       Past Medical History:   Diagnosis Date   • Anxiety    • Depression    • Psychiatric disorder        History reviewed  No pertinent surgical history      Family History   Problem Relation Age of Onset   • No Known Problems Mother    • No Known Problems Father    • Lymphoma Maternal Grandmother    • Diabetes Maternal Grandmother    • Prostate cancer Maternal Grandfather      I have reviewed and agree with the history as documented  E-Cigarette/Vaping     E-Cigarette/Vaping Substances     Social History     Tobacco Use   • Smoking status: Never   • Smokeless tobacco: Never   Substance Use Topics   • Alcohol use: Not Currently   • Drug use: Not Currently       Review of Systems   Constitutional: Negative  HENT: Negative  Eyes: Negative  Respiratory: Negative  Cardiovascular: Negative  Gastrointestinal: Negative  Endocrine: Negative  Genitourinary: Negative  Skin: Negative  Allergic/Immunologic: Negative  Neurological: Negative  Hematological: Negative  Psychiatric/Behavioral: Positive for sleep disturbance and suicidal ideas  Negative for behavioral problems  The patient is nervous/anxious  Physical Exam  Physical Exam  Vitals and nursing note reviewed  Constitutional:       Appearance: She is well-developed  HENT:      Head: Normocephalic and atraumatic  Right Ear: External ear normal       Left Ear: External ear normal       Nose: Nose normal       Mouth/Throat:      Mouth: Mucous membranes are moist       Pharynx: No oropharyngeal exudate  Eyes:      General: No scleral icterus  Right eye: No discharge  Left eye: No discharge  Conjunctiva/sclera: Conjunctivae normal       Pupils: Pupils are equal, round, and reactive to light  Cardiovascular:      Rate and Rhythm: Normal rate and regular rhythm  Pulses: Normal pulses  Heart sounds: Normal heart sounds  Pulmonary:      Effort: Pulmonary effort is normal  No respiratory distress  Breath sounds: Normal breath sounds  No wheezing  Abdominal:      General: Bowel sounds are normal       Palpations: Abdomen is soft  Musculoskeletal:         General: Normal range of motion  Cervical back: Normal range of motion and neck supple  Lymphadenopathy:      Cervical: No cervical adenopathy  Skin:     General: Skin is warm and dry        Capillary Refill: Capillary refill takes less than 2 seconds  Neurological:      General: No focal deficit present  Mental Status: She is alert and oriented to person, place, and time  Psychiatric:         Mood and Affect: Mood is anxious and depressed  Affect is tearful  Thought Content: Thought content is paranoid  Thought content includes suicidal ideation  Thought content includes suicidal plan  Vital Signs  ED Triage Vitals [03/27/23 1132]   Temperature Pulse Respirations Blood Pressure SpO2   98 °F (36 7 °C) 85 16 125/55 98 %      Temp Source Heart Rate Source Patient Position - Orthostatic VS BP Location FiO2 (%)   Tympanic Monitor Sitting Left arm --      Pain Score       No Pain           Vitals:    03/27/23 1132   BP: 125/55   Pulse: 85   Patient Position - Orthostatic VS: Sitting         Visual Acuity      ED Medications  Medications - No data to display    Diagnostic Studies  Results Reviewed     Procedure Component Value Units Date/Time    FLU/RSV/COVID - if FLU/RSV clinically relevant [[id number]]  (Normal) Collected: 03/27/23 1239    Lab Status: Final result Specimen: Nares from Nasopharyngeal Swab Updated: 03/27/23 1326     SARS-CoV-2 Negative     INFLUENZA A PCR Negative     INFLUENZA B PCR Negative     RSV PCR Negative    Narrative:      FOR PEDIATRIC PATIENTS - copy/paste COVID Guidelines URL to browser: [email or web address]    SARS-CoV-2 assay is a Nucleic Acid Amplification assay intended for the  qualitative detection of nucleic acid from SARS-CoV-2 in nasopharyngeal  swabs  Results are for the presumptive identification of SARS-CoV-2 RNA  Positive results are indicative of infection with SARS-CoV-2, the virus  causing COVID-19, but do not rule out bacterial infection or co-infection  with other viruses  Laboratories within the [place] and its  territories are required to report all positive results to the appropriate  public health authorities   Negative results do not preclude SARS-CoV-2  infection and should not be used as the sole basis for treatment or other  patient management decisions  Negative results must be combined with  clinical observations, patient history, and epidemiological information  This test has not been FDA cleared or approved  This test has been authorized by FDA under an Emergency Use Authorization  (EUA)  This test is only authorized for the duration of time the  declaration that circumstances exist justifying the authorization of the  emergency use of an in vitro diagnostic tests for detection of SARS-CoV-2  virus and/or diagnosis of COVID-19 infection under section 564(b)(1) of  the Act, 21 U  S C  219BQG-3(B)(1), unless the authorization is terminated  or revoked sooner  The test has been validated but independent review by FDA  and CLIA is pending  Test performed using [employer] GeneXpert: This RT-PCR assay targets N2,  a region unique to SARS-CoV-2  A conserved region in the E-gene was chosen  for pan-Sarbecovirus detection which includes SARS-CoV-2  According to CMS-2020-01-R, this platform meets the definition of high-throughput technology  Rapid drug screen, urine [[id number]]  (Normal) Collected: 03/27/23 1236    Lab Status: Final result Specimen: Urine, Clean Catch Updated: 03/27/23 1301     Amph/Meth UR Negative     Barbiturate Ur Negative     Benzodiazepine Urine Negative     Cocaine Urine Negative     Methadone Urine Negative     Opiate Urine Negative     PCP Ur Negative     THC Urine Negative     Oxycodone Urine Negative    Narrative:      FOR MEDICAL PURPOSES ONLY  IF CONFIRMATION NEEDED PLEASE CONTACT THE LAB WITHIN 5 DAYS      Drug Screen Cutoff Levels:  AMPHETAMINE/METHAMPHETAMINES  1000 ng/mL  BARBITURATES     200 ng/mL  BENZODIAZEPINES     200 ng/mL  COCAINE      300 ng/mL  METHADONE      300 ng/mL  OPIATES      300 ng/mL  PHENCYCLIDINE     25 ng/mL  THC       50 ng/mL  OXYCODONE      100 ng/mL    POCT pregnancy, urine [[id number]]  (Normal) Resulted: 03/27/23 1239    Lab Status: Final result Specimen: Urine Updated: 03/27/23 1239     EXT Preg Test, Ur Negative     Control Valid    POCT alcohol breath test [[id number]]  (Normal) Resulted: 03/27/23 1233    Lab Status: Final result Updated: 03/27/23 1234     EXTBreath Alcohol 0 000                 No orders to display              Procedures  Procedures         ED Course     Patient is medically clear for inpatient behavioral health unit admission  Patient seen by crisis and signed 12  MDM    Disposition  Final diagnoses:   Suicidal ideation   Depression     Time reflects when diagnosis was documented in both MDM as applicable and the Disposition within this note     Time User Action Codes Description Comment    3/27/2023  1:06 PM Bonna Ards Add [V79 802] Suicidal ideation     3/27/2023  1:06 PM Bonna Ards Add Maci KINGSLEY] Depression       ED Disposition     ED Disposition   Transfer to Behavioral Health    [facility]   --    Date/Time   Mon Mar 27, 2023  1:06 PM    Comment   Carmencita Bermudez should be transferred out to and has been medically cleared  Follow-up Information    None         Patient's Medications   Discharge Prescriptions    No medications on file       No discharge procedures on file      PDMP Review     None          ED Provider  Electronically Signed by           Thomas Nesbitt MD  03/27/23 7500

## 2023-03-27 NOTE — LETTER
"Section I - General Information    Name of Patient: Merrick Zamora                 : 1995    Medicare #:____________________  Transport Date: 23 (PCS is valid for round trips on this date and for all repetitive trips in the 60-day range as noted below )  Origin: [address] [place] [address]                                                         Destination:________________________________________________  Is the pt's stay covered under Medicare Part A (PPS/DRG)     (_) YES  (_) NO  Closest appropriate facility?  (_) YES  (_) NO  If no, why is transport to more distant facility required?________________________  If hosp-hosp transfer, describe services needed at 2nd facility not available at 1st facility? _________________________________  If hospice pt, is this transport related to pt's terminal illness? (_) YES (_) NO Describe____________________________________    Section II - Medical Necessity Questionnaire  Ambulance transportation is medically necessary only if other means of transport are contraindicated or would be potentially harmful to the patient  To meet this requirement, the patient must either be \"bed confined\" or suffer from a condition such that transport by means other than ambulance is contraindicated by the patient's condition   The following questions must be answered by the medical professional signing below for this form to be valid:    1)  Describe the MEDICAL CONDITION (physical and/or mental) of this patient AT [address] [place] [address] that requires the patient to be transported in an ambulance and why transport by other means is contraindicated by the patient's condition:__________________________________________________________________________________________________    2) Is the patient \"bed confined\" as defined below?     (_) YES  (_) NO  To be \"be confined\" the patient must satisfy all three of the following conditions: (1) unable to get up from " bed without Assistance; AND (2) unable to ambulate; AND (3) unable to sit in a chair or wheelchair  3) Can this patient safely be transported by car or wheelchair [facility] (i e , seated during transport without a medical attendant or monitoring)?   (_) YES  (_) NO    4) In addition to completing questions 1-3 above, please check any of the following conditions that apply*:  *Note: supporting documentation for any boxes checked must be maintained in the patient's medical records  (_)Contractures   (_)Non-Healed Fractures  (_)Patient is confused (_)Patient is comatose (_)Moderate/severe pain on movement (_)Danger to self/others  (_)IV meds/fluids required (_)Patient is combative(_)Need or possible need for restraints (_)DVT requires elevation of lower extremity  (_)Medical attendant required (_)Requires oxygen-unable to self administer (_)Special handling/isolation/infection control precautions required (_)Unable to tolerate seated position for time needed to transport (_)Hemodynamic monitoring required en route (_)Unable to sit in a chair or wheelchair due to decubitus ulcers or other wounds (_)Cardiac monitoring required en route (_)Morbid obesity requires additional personnel/equipment to safely handle patient (_)Orthopedic device (backboard, halo, pins, traction, brace, wedge, etc,) requiring special handling during transport (_)Other(specify)_______________________________________________    Section III - Signature of Physician or Healthcare Professional    I certify that the above information is accurate based on my evaluation of this patient, and that the medical necessity provisions of 42  40(e)(1) are met, requiring that this patient be transported by ambulance  I understand this information will be used by the Centers for Medicare and Medicaid Services (CMS) to support the determination of medical necessity for ambulance services   I represent that I am the beneficiary’s attending physician; or an employee of the beneficiary’s attending physician, or the hospital or facility where the beneficiary is being treated and from which the beneficiary is being transported; that I have personal knowledge of the beneficiary’s condition at the time of transport; and that I meet all Medicare regulations and applicable State licensure laws for the credential indicated  (_) If this box is checked, I also certify that the patient is physically or mentally incapable of signing the ambulance service's claim and that the institution with which I am affiliated has furnished care, services, or assistance to the patient  My signature below is made on behalf of the patient pursuant to 42 CFR §424 36(b)(4)  In accordance with 42 CFR §424 37, the specific reason(s) that the patient is physically or mentally incapable of signing the claim form is as follows: _________________________________________________________________________________________________________      Signature of Physician* or Healthcare Professional______________________________________________________________  Signature Date 03/27/23 (For scheduled repetitive transports, this form is not valid for transports performed more than 60 days after this date)    Printed Name & Credentials of Physician or Healthcare Professional (MD, DO, RN, etc )________________________________  *Form must be signed by patient's attending physician for scheduled, repetitive transports   For non-repetitive, unscheduled ambulance transports, if unable to obtain the signature of the attending physician, any of the following may sign (choose appropriate option below)  (_) Physician Assistant   (_)  Clinical Nurse Specialist    (_)  Licensed Practical Nurse    (_)    (_)  Nurse Practitioner     (_)  Registered Nurse                (_)                         (_) Discharge Planner

## 2023-03-27 NOTE — ED NOTES
Patient is accepted at [facility]  Patient is accepted by Arabella Flores/Lilia  Transportation is arranged with [facility]  Transportation is scheduled for 1800  Patient may go to the floor at 1800  Nurse report is to be called to [phone] prior to patient transfer

## 2023-03-27 NOTE — ED NOTES
CIS met with patient and completed assessment and safety risk  Patient is a 32year old female who was sent here from [address] [place] [address] today where she came via EMS after she stated that she was suicidal   Patient stated that she has a plan to crash her car she stated that daily she thinks of driving off the road  Patient tearful throughout assessment she does not feel that there is a purpose for living  Patient stated that she has always been depressed and kept to herself she reports not having friends or a social life  Patient resides with her parents as she can not afford to move out on her own, patient expresses that money is a huge stressor for her  Her parents lack of understanding and work are also stressors  Patient has a 4 year college degree and has been working at her place of employment for the past 6 years  Patient has recently stated with paranoia she feels everything is a lie and that she is being watched, originally she thought it was the prozac which she has since stopped and put on new medications  Patient feels limited motivation, worthless and like she is trapped  Patient does not feel the medications with starting therapy is helping or giving her a purpose to live  Patient is agreeable to sign a 201 as she is not able to contract for safety at home    Patient does not feel she can contract for safety outside of the hospital

## 2023-03-27 NOTE — LETTER
[facility] [address]  Dept: [phone]      EMTALA TRANSFER CONSENT    NAME Ari Blanc 1995                              MRN [id number]    I have been informed of my rights regarding examination, treatment, and transfer   by Dr Gisselle Mcclure MD    Benefits: Continuity of care    Risks: Potential for delay in receiving treatment      Consent for Transfer:  I acknowledge that my medical condition has been evaluated and explained to me by the emergency department physician or other qualified medical person and/or my attending physician, who has recommended that I be transferred to the service of  Accepting Physician: Jessica Barth at [address] Name, [address] : [place] ROBERT [place], [place]  The above potential benefits of such transfer, the potential risks associated with such transfer, and the probable risks of not being transferred have been explained to me, and I fully understand them  The doctor has explained that, in my case, the benefits of transfer outweigh the risks  I agree to be transferred  I authorize the performance of emergency medical procedures and treatments upon me in both transit and upon arrival at the receiving facility  Additionally, I authorize the release of any and all medical records to the receiving facility and request they be transported with me, if possible  I understand that the safest mode of transportation during a medical emergency is an ambulance and that the Hospital advocates the use of this mode of transport  Risks of traveling to the receiving facility by car, including absence of medical control, life sustaining equipment, such as oxygen, and medical personnel has been explained to me and I fully understand them  (SANNA CORRECT BOX BELOW)  [ X ]  I consent to the stated transfer and to be transported by ambulance/helicopter    [  ]  I consent to the stated transfer, but refuse transportation by ambulance and accept full responsibility for my transportation by car  I understand the risks of non-ambulance transfers and I exonerate the Hospital and its staff from any deterioration in my condition that results from this refusal     X___________________________________________    DATE  23  TIME________  Signature of patient or legally responsible individual signing on patient behalf           RELATIONSHIP TO PATIENT______self___________________                    Provider Certification    NAME Horace Berry                                         1995                              MRN [id number]    A medical screening exam was performed on the above named patient  Based on the examination:    Condition Necessitating Transfer The primary encounter diagnosis was Suicidal ideation  A diagnosis of Depression was also pertinent to this visit  Patient Condition: The patient has been stabilized such that within reasonable medical probability, no material deterioration of the patient condition or the condition of the unborn child(amanda) is likely to result from the transfer    Reason for Transfer: Level of Care needed not available at this facility    Transfer Requirements: Facility [place], [place]   · Space available and qualified personnel available for treatment as acknowledged by Binu Watt [phone]  · Agreed to accept transfer and to provide appropriate medical treatment as acknowledged by       [facility]  · Appropriate medical records of the examination and treatment of the patient are provided at the time of transfer   [address], [address] __IK_____  · Transfer will be performed by qualified personnel from [facility]  and appropriate transfer equipment as required, including the use of necessary and appropriate life support measures      Provider Certification: I have examined the patient and explained the following risks and benefits of being transferred/refusing transfer to the patient/family:  The patient is stable for psychiatric transfer because they are medically stable, and is protected from harming him/herself or others during transport      Based on these reasonable risks and benefits to the patient and/or the unborn child(amanda), and based upon the information available at the time of the patient’s examination, I certify that the medical benefits reasonably to be expected from the provision of appropriate medical treatments at another medical facility outweigh the increasing risks, if any, to the individual’s medical condition, and in the case of labor to the unborn child, from effecting the transfer      X____________________________________________ DATE 03/27/23        TIME_______      ORIGINAL - SEND TO MEDICAL RECORDS   COPY - SEND WITH PATIENT DURING TRANSFER

## 2023-03-28 PROBLEM — F32.3 MDD (MAJOR DEPRESSIVE DISORDER), SINGLE EPISODE, SEVERE WITH PSYCHOTIC FEATURES (HCC): Status: ACTIVE | Noted: 2023-03-28

## 2023-03-28 PROBLEM — F41.1 GAD (GENERALIZED ANXIETY DISORDER): Status: ACTIVE | Noted: 2023-03-28

## 2023-04-10 PROBLEM — F32.3 MDD (MAJOR DEPRESSIVE DISORDER), SINGLE EPISODE, SEVERE WITH PSYCHOTIC FEATURES (HCC): Status: RESOLVED | Noted: 2023-03-28 | Resolved: 2023-04-10

## 2023-04-18 ENCOUNTER — HOSPITAL ENCOUNTER (INPATIENT)
Facility: HOSPITAL | Age: 28
LOS: 8 days | Discharge: HOME | End: 2023-04-26
Attending: PSYCHIATRY & NEUROLOGY | Admitting: PSYCHIATRY & NEUROLOGY

## 2023-04-18 DIAGNOSIS — E53.8 VITAMIN B12 DEFICIENCY: ICD-10-CM

## 2023-04-18 DIAGNOSIS — F39 MOOD DISORDER WITH PSYCHOSIS (HCC): Primary | ICD-10-CM

## 2023-04-18 DIAGNOSIS — F41.1 GAD (GENERALIZED ANXIETY DISORDER): ICD-10-CM

## 2023-04-18 DIAGNOSIS — E55.9 VITAMIN D DEFICIENCY: ICD-10-CM

## 2023-04-18 DIAGNOSIS — F51.02 INSOMNIA DUE TO STRESS: ICD-10-CM

## 2023-04-18 PROBLEM — F25.1 SCHIZOAFFECTIVE DISORDER, DEPRESSIVE TYPE (HCC): Status: ACTIVE | Noted: 2023-03-11

## 2023-04-18 RX ORDER — POLYETHYLENE GLYCOL 3350 17 G/17G
17 POWDER, FOR SOLUTION ORAL DAILY PRN
Status: DISCONTINUED | OUTPATIENT
Start: 2023-04-18 | End: 2023-04-26 | Stop reason: HOSPADM

## 2023-04-18 RX ORDER — HYDROXYZINE 50 MG/1
50 TABLET, FILM COATED ORAL
Status: DISCONTINUED | OUTPATIENT
Start: 2023-04-18 | End: 2023-04-26 | Stop reason: HOSPADM

## 2023-04-18 RX ORDER — PALIPERIDONE 3 MG/1
3 TABLET, EXTENDED RELEASE ORAL DAILY
Status: DISCONTINUED | OUTPATIENT
Start: 2023-04-19 | End: 2023-04-18

## 2023-04-18 RX ORDER — MAGNESIUM HYDROXIDE/ALUMINUM HYDROXICE/SIMETHICONE 120; 1200; 1200 MG/30ML; MG/30ML; MG/30ML
30 SUSPENSION ORAL EVERY 4 HOURS PRN
Status: DISCONTINUED | OUTPATIENT
Start: 2023-04-18 | End: 2023-04-26 | Stop reason: HOSPADM

## 2023-04-18 RX ORDER — MELATONIN
1000 DAILY
Status: DISCONTINUED | OUTPATIENT
Start: 2023-05-15 | End: 2023-04-26 | Stop reason: HOSPADM

## 2023-04-18 RX ORDER — OLANZAPINE 2.5 MG/1
2.5 TABLET ORAL
Status: DISCONTINUED | OUTPATIENT
Start: 2023-04-18 | End: 2023-04-26 | Stop reason: HOSPADM

## 2023-04-18 RX ORDER — ACETAMINOPHEN 325 MG/1
650 TABLET ORAL EVERY 4 HOURS PRN
Status: DISCONTINUED | OUTPATIENT
Start: 2023-04-18 | End: 2023-04-26 | Stop reason: HOSPADM

## 2023-04-18 RX ORDER — TRAZODONE HYDROCHLORIDE 100 MG/1
100 TABLET ORAL
Status: DISCONTINUED | OUTPATIENT
Start: 2023-04-18 | End: 2023-04-26 | Stop reason: HOSPADM

## 2023-04-18 RX ORDER — OLANZAPINE 10 MG/1
5 INJECTION, POWDER, LYOPHILIZED, FOR SOLUTION INTRAMUSCULAR
Status: DISCONTINUED | OUTPATIENT
Start: 2023-04-18 | End: 2023-04-26 | Stop reason: HOSPADM

## 2023-04-18 RX ORDER — HYDROXYZINE 50 MG/1
100 TABLET, FILM COATED ORAL
Status: DISCONTINUED | OUTPATIENT
Start: 2023-04-18 | End: 2023-04-26 | Stop reason: HOSPADM

## 2023-04-18 RX ORDER — HYDROXYZINE HYDROCHLORIDE 25 MG/1
25 TABLET, FILM COATED ORAL
Status: DISCONTINUED | OUTPATIENT
Start: 2023-04-18 | End: 2023-04-26 | Stop reason: HOSPADM

## 2023-04-18 RX ORDER — ACETAMINOPHEN 325 MG/1
650 TABLET ORAL EVERY 6 HOURS PRN
Status: DISCONTINUED | OUTPATIENT
Start: 2023-04-18 | End: 2023-04-26 | Stop reason: HOSPADM

## 2023-04-18 RX ORDER — SERTRALINE HYDROCHLORIDE 100 MG/1
100 TABLET, FILM COATED ORAL ONCE
Status: COMPLETED | OUTPATIENT
Start: 2023-04-18 | End: 2023-04-18

## 2023-04-18 RX ORDER — ERGOCALCIFEROL 1.25 MG/1
50000 CAPSULE ORAL WEEKLY
Status: DISCONTINUED | OUTPATIENT
Start: 2023-04-18 | End: 2023-04-26 | Stop reason: HOSPADM

## 2023-04-18 RX ORDER — ACETAMINOPHEN 325 MG/1
975 TABLET ORAL EVERY 6 HOURS PRN
Status: DISCONTINUED | OUTPATIENT
Start: 2023-04-18 | End: 2023-04-26 | Stop reason: HOSPADM

## 2023-04-18 RX ORDER — RISPERIDONE 2 MG/1
2 TABLET ORAL 2 TIMES DAILY
Status: DISCONTINUED | OUTPATIENT
Start: 2023-04-18 | End: 2023-04-18

## 2023-04-18 RX ORDER — LORAZEPAM 2 MG/ML
2 INJECTION INTRAMUSCULAR EVERY 6 HOURS PRN
Status: DISCONTINUED | OUTPATIENT
Start: 2023-04-18 | End: 2023-04-26 | Stop reason: HOSPADM

## 2023-04-18 RX ORDER — OLANZAPINE 10 MG/1
10 INJECTION, POWDER, LYOPHILIZED, FOR SOLUTION INTRAMUSCULAR
Status: DISCONTINUED | OUTPATIENT
Start: 2023-04-18 | End: 2023-04-26 | Stop reason: HOSPADM

## 2023-04-18 RX ORDER — PALIPERIDONE 3 MG/1
3 TABLET, EXTENDED RELEASE ORAL DAILY
Status: DISCONTINUED | OUTPATIENT
Start: 2023-04-18 | End: 2023-04-26 | Stop reason: HOSPADM

## 2023-04-18 RX ORDER — OLANZAPINE 10 MG/1
10 TABLET ORAL
Status: DISCONTINUED | OUTPATIENT
Start: 2023-04-18 | End: 2023-04-26 | Stop reason: HOSPADM

## 2023-04-18 RX ORDER — DIPHENHYDRAMINE HYDROCHLORIDE 50 MG/ML
50 INJECTION INTRAMUSCULAR; INTRAVENOUS EVERY 6 HOURS PRN
Status: DISCONTINUED | OUTPATIENT
Start: 2023-04-18 | End: 2023-04-26 | Stop reason: HOSPADM

## 2023-04-18 RX ORDER — BENZTROPINE MESYLATE 1 MG/1
1 TABLET ORAL EVERY 6 HOURS PRN
Status: DISCONTINUED | OUTPATIENT
Start: 2023-04-18 | End: 2023-04-26 | Stop reason: HOSPADM

## 2023-04-18 RX ORDER — LANOLIN ALCOHOL/MO/W.PET/CERES
3 CREAM (GRAM) TOPICAL
Status: DISCONTINUED | OUTPATIENT
Start: 2023-04-18 | End: 2023-04-19

## 2023-04-18 RX ORDER — OLANZAPINE 5 MG/1
5 TABLET ORAL
Status: DISCONTINUED | OUTPATIENT
Start: 2023-04-18 | End: 2023-04-26 | Stop reason: HOSPADM

## 2023-04-18 RX ADMIN — ERGOCALCIFEROL 50000 UNITS: 1.25 CAPSULE ORAL at 19:44

## 2023-04-18 RX ADMIN — Medication 3 MG: at 20:58

## 2023-04-18 RX ADMIN — PALIPERIDONE 3 MG: 3 TABLET, EXTENDED RELEASE ORAL at 15:57

## 2023-04-18 RX ADMIN — SERTRALINE 100 MG: 100 TABLET, FILM COATED ORAL at 15:56

## 2023-04-18 NOTE — PLAN OF CARE
Problem: DISCHARGE PLANNING - CARE MANAGEMENT  Goal: Discharge to post-acute care or home with appropriate resources  Description: INTERVENTIONS:  - Conduct assessment to determine patient/family and health care team treatment goals, and need for post-acute services based on payer coverage, community resources, and patient preferences, and barriers to discharge  - Address psychosocial, clinical, and financial barriers to discharge as identified in assessment in conjunction with the patient/family and health care team  - Arrange appropriate level of post-acute services according to patient’s   needs and preference and payer coverage in collaboration with the physician and health care team  - Communicate with and update the patient/family, physician, and health care team regarding progress on the discharge plan  - Arrange appropriate transportation to post-acute venues  Outcome: Progressing     Pt ne 201, goal initiated

## 2023-04-18 NOTE — NURSING NOTE
"25year old female admitted to unit at 1328 from [address] [place] [address] ED with increased anxiety,  depression and SI to cut wrists  Patient states she was taking Risperdal and Zoloft, she states the Risperdal was making her tired and dizzy and she could not function properly, so  her home doctor said she could stop taking it  She then began having panic attacks which she believes is due to the Zoloft  She began having feelings of SI over the past few days to cut her wrists which was causing increased panic attacks so she went to ED due to not feeling safe alone  Patient states she feels safe on the unit at this time and is not currently having SI feelings she is teary eyed states her depression is \"worse than last time\"  Denies ROBER VILLALBA  Patient showered, skin check done, signed all paperwork and shown to her room  Safety checks maintained    " 4

## 2023-04-18 NOTE — PLAN OF CARE
Problem: Alteration in Thoughts and Perception  Goal: Verbalize thoughts and feelings  Description: Interventions:  - Promote a nonjudgmental and trusting relationship with the patient through active listening and therapeutic communication  - Assess patient's level of functioning, behavior and potential for risk  - Engage patient in 1 on 1 interactions  - Encourage patient to express fears, feelings, frustrations, and discuss symptoms    - [place] patient to reality, help patient recognize reality-based thinking   - Administer medications as ordered and assess for potential side effects  - Provide the patient education related to the signs and symptoms of the illness and desired effects of prescribed medications  Outcome: Progressing

## 2023-04-18 NOTE — SOCIAL WORK
Pt requested sw be present for psych evaluation with pt to help advocate in regard to tx, medication concerns  Pt presents tearful at times, but advocated for self with little assistance from sw  Pt agreeable to plan to decrease dose/administration time of zoloft, continue melatonin, start 3mg Invega PO  Provider provided education on medication, side effects with pt understanding

## 2023-04-18 NOTE — SOCIAL WORK
Pt new 201 from [facility] for SI, AH  Pt presents calm, cooperative with poor eye contact at times  Pt denies current SI/HI/AVH, endorses anx/dep 10/10  Pt states [facility] tapered risperdol due to extreme daytime sleepiness, poor concentration, lack of focus and eventually had pt stop cold turkey  Pt reports daily panic attacks, currently only taking zoloft  Pt states yesterday at 0400, pt had SI to slit wrists, had fleeting SI since last dc 4/10/23  Pt would like to stop all meds and start over to figure out what current baseline is  Pt feels past delusions, AH, paranoid about government was caused by Prozac briefly taken in Feb 23  Psychosocial completed during last admission 3/28/23 with necessary changes  Sw met with pt in small group room to complete psychosocial, ROIs  Stressors: adverse reaction to Prozac one month ago causing paranoia  Coping skills: exercise, alone time  Strengths: makes basic needs, known, family ties, cooperative       Pt AIP 3/11-3/14/23, 3/27-4/10/23; current psych tx    Pt denies SA, HI, violence towards others in last 12 months  Pt endorse SI 2x in last yr without plan  Pt denies access to firearms  Pt denies hx abuse, trauma  Pt denies family hx mental illness, SI, HI, substance abuse  Pt endorses hx tobacco vape, but denies all current substance use  Pt denies legal issues      Pt is single, heterosexual female with no children, pets being care for by parents  Pt lives with parents, is able to return home, endorses positive relationships with parents, sister Brenna Bowling  Pt graduated HS 2011, has [facility], makes about $2100/month in income as  at [employer]  Pt pro  hx  Pt denies assistive devices, physical barriers in the home  Pt denies religous, cultural needs on unit  Pt has 's license, car  Pt denies current POA, guardianship, advanced directives   [facility]: [facility]      Pt agreeable to psych Op follow up, php referral, talk therapy, pt declined pcp referral      NATHAN signed:  Hugo Dixon (psych) [phone] notified of pt's admission via   Sw received follow up  stating pt has appt next week, to call [facility] to reschedule as needed  Renae Brown (mother) [phone] notified of pt's admission, discussed medication adjustments, treatment  Call ended mutually  PHP referral will be submitted once dc date is established

## 2023-04-18 NOTE — H&P
Psychiatric Evaluation - Behavioral Health     Identification Data:Lizette Herrera 25 y o  female MRN: [id number]  Unit/Bed#: [facility] 727-88 Encounter: [id number]    Chief Complaint: depression, anxiety and suicidal ideation    History of Present Illness     Dee Dee Andrea is a 25 y o  female with a history of bipolar disorder versus schizoaffective disorder and Generalized Anxiety Disorder who was admitted to the inpatient psychiatric unit on a voluntary 201 commitment basis due to depression, anxiety, paranoid ideation, suicidal ideation and suicidal ideation with plan to cut wrists  Symptoms prior to admission included worsening depression, depression and feeling depressed  Onset of symptoms was abrupt starting several days ago with progressively worsening course since that time  Stressors preceding admission included ongoing anxiety  Patient says she has a plan of cutting her wrists but says she has not acted on it  The patient has been seen here for similar symptoms in the past   According to chart review, she was recently discharged from the [facility] on 4-10 for similar symptoms  She says she has been taking her Zoloft as prescribed  She denies any drug or alcohol use today  Mom says that these issues started fairly recently  She says that she really started having issues with depression and suicidal ideation starting in January  She is concerned that it may be secondary to COVID psychosis  She says that in January the patient was paranoid of her would not talk to her  She says that that is since improved  The patient denies any visual or auditory hallucinations  She admits to a mild frontal headache over the last couple days similar to headache she has had before  Patient appears depressed with flat affect  Patient eye contact is poor and speech is slow and low in tone  Patient tearful upon communication  Patient reports SI with plan to cut her wrists   No prior "SA  Patient unable to provide a specific trigger, stating \" everything, just everything  \" Patient denies HI as well as hallucinations and paranoia  Patient does however report racing thoughts causing disturbances with sleep (decrease) as well as lack of concentration and motivation  Patient states she has be unable to go to work  Patient also reports little to no appetite  On initial evaluation after admission to the inpatient psychiatric unit the patient was guarded, sad and even tearful at times  Mp Asp \"Prozac led to my delusion\"  Abilify did not help  Risperidone made me too drowsy to function  The patient had outpatient appointment was not psychiatric nurse practitioner after discharge and according to the patient's nurse practitioner stated he had to fast taper Risperidone  Few days after risperidone was stopped by the patient, Diana Atwood became more depressed and suicidal thoughts with a plan to cut her wrist reemerged  At this time she denied \"messages\" that she used to hear in her head      Psychiatric Review Of Systems:    sleep changes: decreased  appetite changes: decreased  energy/anergy: decreased  anxiety/panic: yes  kyle: no  self injurious behavior/risky behavior: no  Suicidal ideation: yes, no plan  Homicidal ideation: no  Auditory hallucinations: no  Visual hallucinations: no  Delusional thinking: no      Historical Information     Past Psychiatric History:     Past Inpatient Psychiatric Treatment:   2 past inpatient psychiatric admissions  Past Outpatient Psychiatric Treatment:    Was in outpatient psychiatric treatment in the past with a psychiatrist  Past Suicide Attempts:    no  Past Psychiatric Medication Trials:    Prozac and Abilify     Substance Abuse History:  Social History     Tobacco History     Smoking Status  Former Quit Date  3/17/2023 Smoking Tobacco Type  Cigarettes quit in 3/17/2023    Smokeless Tobacco Use  Never          Alcohol History     Alcohol Use Status  Not Currently       " Drug Use     Drug Use Status  Not Currently          Sexual Activity     Sexually Active  Not Asked          Activities of Daily Living    Not Asked                 I have assessed this patient for substance use within the past 12 months    History of Inpatient/Outpatient rehabilitation program: no  Smoking history: none    Family Psychiatric History:     Psychiatric Illness:  patient denies  Substance Abuse:  patient denies  Suicide Attempts:  patient denies    Social History:    Education: high school diploma/GED  Marital History: single  Occupational History: currently unemployed          Traumatic History:     Abuse: not willing to provide details    Past Medical History:    History of Seizures: no    Past Medical History:   Diagnosis Date   • Anxiety    • Depression    • Psychiatric disorder      No past surgical history on file  Medical Review Of Systems:    EFO Review Of Systems: A comprehensive review of systems was negative  Allergies:    No Known Allergies    Medications: All current active medications have been reviewed      Objective     Vital signs in last 24 hours:    Temp:  [97 9 °F (36 6 °C)-98 8 °F (37 1 °C)] 97 9 °F (36 6 °C)  HR:  [] 88  Resp:  [16-18] 18  BP: ()/(59-70) 119/70    No intake or output data in the 24 hours ending 04/18/23 1441     Mental Status Evaluation:      Appearance:  dressed appropriately, casually dressed   Behavior:  cooperative, calm   Mood:  depressed, anxious   Affect: constricted, tearful    Speech:  decreased rate, slow   Language: appropriate   Thought Process:  concrete   Associations: concrete associations   Thought Content:  negative thinking   Perceptual Disturbances: does not appear responding to internal stimuli   Risk Potential: Suicidal ideation - None at present  Homicidal ideation - None  Potential for aggression - No   Sensorium:  oriented to person, place and time   Memory:  recent and remote memory grossly intact   Consciousness: alert and awake   Attention: decreased attention span   Fund of Knowledge: awareness of current events appropriate   Insight:  limited   Judgment: limited   Muscle Tone: normal   Gait/Station: normal gait/station and normal balance   Motor Activity: no abnormal movements               Laboratory Results:   I have personally reviewed all pertinent laboratory/tests results  Most Recent Labs:   Lab Results   Component Value Date    WBC 5 83 04/17/2023    RBC 4 14 04/17/2023    HGB 13 6 04/17/2023    HCT 42 3 04/17/2023     04/17/2023    RDW 12 0 04/17/2023    NEUTROABS 4 39 04/17/2023    SODIUM 136 04/17/2023    K 4 3 04/17/2023     04/17/2023    CO2 27 04/17/2023    BUN 17 04/17/2023    CREATININE 0 67 04/17/2023    GLUC 131 04/17/2023    GLUF 88 03/28/2023    CALCIUM 8 9 04/17/2023    AST 17 04/17/2023    ALT 25 04/17/2023    ALKPHOS 33 (L) 04/17/2023    TP 6 6 04/17/2023    ALB 4 1 04/17/2023    TBILI 0 91 04/17/2023    [id number] 0 972 04/17/2023       Imaging Studies: CT head wo contrast    Result Date: 3/30/2023  Narrative: CT BRAIN - WITHOUT CONTRAST INDICATION:   Mental status change, unknown cause  COMPARISON:  None  TECHNIQUE:  CT examination of the brain was performed  Multiplanar 2D reformatted images were created from the source data  Radiation dose length product (DLP) for this visit:  843 68 mGy-cm   This examination, like all CT scans performed in the [facility], was performed utilizing techniques to minimize radiation dose exposure, including the use of iterative  reconstruction and automated exposure control  IMAGE QUALITY:  Diagnostic  FINDINGS: PARENCHYMA:  No intracranial mass, mass effect or midline shift  No CT signs of acute infarction  No acute parenchymal hemorrhage  VENTRICLES AND EXTRA-AXIAL SPACES:  Normal for the patient's age  VISUALIZED ORBITS: Normal visualized orbits  PARANASAL SINUSES: Normal visualized paranasal sinuses   CALVARIUM AND "EXTRACRANIAL SOFT TISSUES:  Normal      Impression: No acute intracranial abnormality  Workstation performed: [id number]       Code Status: Prior    Assessment/Plan   Principal Problem:    Major depressive disorder, recurrent, severe, with psychotic features      Treatment Plan:     Planned Treatment and Medication Changes:    [unfilled]     All current active medications have been reviewed  Encourage group therapy, milieu therapy and occupational therapy  Behavioral Health checks every 7 minutes  Restart patient on Zoloft  Reduction, because higher doses were associated with his patient's feeling of \"emotional numbness \"  We will not restart risperidone because the patient felt tired and sleepy unable to concentrate on that medication, but because the risperidone helps the patient was symptoms of psychosis and mood, we will start Invega  Gabrielle Bring was selected because its metabolite of risperidone that usually it is not associated with his tiredness  Potential side effects and benefits of Invega were discussed with the patient in detail, the patient's  who was present during our discussion of medication management      Current Facility-Administered Medications   Medication Dose Route Frequency Provider Last Rate   • acetaminophen  650 mg Oral Q6H PRN Jagjit Cardenasei, CRNP     • acetaminophen  650 mg Oral Q4H PRN Jagjit Tian [facility]     • acetaminophen  975 mg Oral Q6H PRN Jagjit Cardenasei, CRNP     • aluminum-magnesium hydroxide-simethicone  30 mL Oral Q4H PRN Jagjit Cardenasei, CRNP     • benztropine  1 mg Oral Q6H PRN Jagjit Cardenasei, CRNP     • hydrOXYzine HCL  50 mg Oral Q6H PRN Max 4/day Jagjit Cardenasei, CRNP      Or   • diphenhydrAMINE  50 mg Intramuscular Q6H PRN Jagjit Cardenasei, CRNP     • hydrOXYzine HCL  100 mg Oral Q6H PRN Max 4/day Jagjit Cardenasei, CRNP      Or   • LORazepam  2 mg Intramuscular Q6H PRN Jagjit Cardenasei, CRNP     • hydrOXYzine HCL  25 mg Oral Q6H PRN Max 4/day Jagjit Cardenasei, CRNP   " • melatonin  3 mg Oral HS Floydene Frater Medei, CRNP     • OLANZapine  10 mg Oral Q3H PRN Max 3/day Floydene Frater Medei, CRIRMA      Or   • OLANZapine  10 mg Intramuscular Q3H PRN Max 3/day Floydene Frater Medei, CRNP     • OLANZapine  5 mg Oral Q3H PRN Max 6/day Floydene Frater Medei, CRNP      Or   • OLANZapine  5 mg Intramuscular Q3H PRN Max 6/day Floydene Frater Medei, CRNP     • OLANZapine  2 5 mg Oral Q3H PRN Max 8/day Floydene Frater Medei, CRNP     • polyethylene glycol  17 g Oral Daily PRN Floydene Frater Medei, CRNP     • risperiDONE  2 mg Oral BID Floydene Frater Medei, CRIRMA     • sertraline  150 mg Oral Daily Floydene Frater Medei, CRNP     • traZODone  100 mg Oral HS PRN Floydene Frater Medei, CRNP         Risks / Benefits of Treatment:    Risks, benefits, and possible side effects of medications explained to patient including risk of parkinsonian symptoms, Tardive Dyskinesia and metabolic syndrome related to treatment with antipsychotic medications  The patient verbalizes understanding and agreement for treatment  Counseling / Coordination of Care:    Patient's presentation on admission and proposed treatment plan discussed with treatment team   Diagnosis, medication changes and treatment plan reviewed with patient  Inpatient Psychiatric Certification:    Estimated length of stay: 7 midnights    Based upon physical, mental and social evaluations, I certify that inpatient psychiatric services are medically necessary for this patient for a duration of 7 midnights for the treatment of Schizoaffective disorder, depressive type [facility])    Available alternative community resources do not meet the patient's mental health care needs  I further attest that an established written individualized plan of care has been implemented and is outlined in the patient's medical records  ** Please Note: This note has been constructed using a voice recognition system   **

## 2023-04-18 NOTE — PROGRESS NOTES
04/18/23 1509   Team Meeting   Meeting Type Tx Team Meeting   Team Members Present   Team Members Present Physician;Nurse;   Physician Team Member Dr Verenice Grady MD   Nursing Team Member Tamir Mccloud RN   Social Work Team Member Asia Asif   Patient/Family Present   Patient Present Yes   Patient's Family Present No     Patient and treatment team met and reviewed pt strengths, limitations, coping skills, treatment plan and goals; pt agreeable and signed; copy on chart

## 2023-04-18 NOTE — TREATMENT PLAN
TREATMENT PLAN REVIEW - [address] Kurt 25 y o  1995 female MRN: [id number]    [address] [facility] [address] [facility] Room / Bed: Cynthia Aaron [facility] 465-58 Encounter: [id number]          Admit Date/Time:  4/18/2023  1:28 PM    Treatment Team: Attending Provider: Marjan Castle MD; Licensed Practical Nurse: Shyann Mcclain LPN; : Kevin Erazo;  Consulting Physician: Dee Driver MD    Diagnosis: Principal Problem:    Major depressive disorder, recurrent, severe, with psychotic features    Patient Strengths: communication skills     Patient Barriers: limited insight    Short Term Goals: decrease in depressive symptoms, decrease in anxiety symptoms, decrease in suicidal thoughts, sleep improvement, improvement in appetite    Long Term Goals: stabilization of mood, free of suicidal thoughts, acceptance of need for psychiatric medications, acceptance of need for psychiatric treatment, acceptance of need for psychiatric follow up after discharge, acceptance of psychiatric diagnosis, adequate self care, adequate sleep, adequate appetite, adequate oral intake    Progress Towards Goals: starting psychitric medications as prescribed, continue psychiatric medications as prescribed    Recommended Treatment: medication management, patient medication education, group therapy, milieu therapy, continued Behavioral Health psychiatric evaluation/assessment process     Treatment Frequency: daily medication monitoring, group and milieu therapy daily, monitoring through interdisciplinary rounds, monitoring through weekly patient care conferences    Expected Discharge Date:  tbd    Discharge Plan: referral for outpatient medication management with a psychiatrist, referral for outpatient psychotherapy    Treatment Plan Created/Updated By: Marjan Castle MD

## 2023-04-19 RX ORDER — LANOLIN ALCOHOL/MO/W.PET/CERES
6 CREAM (GRAM) TOPICAL
Status: DISCONTINUED | OUTPATIENT
Start: 2023-04-19 | End: 2023-04-26 | Stop reason: HOSPADM

## 2023-04-19 RX ADMIN — SERTRALINE HYDROCHLORIDE 75 MG: 50 TABLET ORAL at 09:20

## 2023-04-19 RX ADMIN — Medication 6 MG: at 20:36

## 2023-04-19 RX ADMIN — CYANOCOBALAMIN TAB 500 MCG 500 MCG: 500 TAB at 09:20

## 2023-04-19 RX ADMIN — PALIPERIDONE 3 MG: 3 TABLET, EXTENDED RELEASE ORAL at 09:20

## 2023-04-19 NOTE — PROGRESS NOTES
04/19/23 1030   Activity/Group Checklist   Group   (Qigong and Mindfulness Art Therapy)   Attendance Attended   Attendance Duration (min) Greater than 60   Interactions Interacted appropriately   Affect/Mood Appropriate  (Patient was often quiet within group )   Goals Achieved Identified feelings; Able to engage in interactions; Able to listen to others;Discussed coping strategies

## 2023-04-19 NOTE — PROGRESS NOTES
04/19/23 1320   Activity/Group Checklist   Group Admission/Discharge   Attendance Attended   Attendance Duration (min) 16-30   Interactions Interacted appropriately   Affect/Mood Appropriate   Goals Achieved Identified feelings; Identified triggers; Identified relapse prevention strategies; Discussed coping strategies; Discussed discharge plans; Identified resources and support systems; Able to listen to others; Identified distorted thoughts/beliefs; Able to engage in interactions; Able to reflect/comment on own behavior;Able to self-disclose; Able to manage/cope with feelings     Patient was agreeable in completing the self-assessment  Patient was quiet and soft spoken, alternating between making eye contact and looking down at her lap  Patient identified medication as a trigger that led to her return to the hospital  She states that her medication was causing her to have SI  Patient states that the biggest challenge she is facing at this moment is her ability to be assertive and ask for what she needs  She expresses that what she likes most about her life is spending time with her dog, who is a Yorkie mix  Patient stated that what she likes least about her life is her job as an  at [employer]  Patient shared that she graduated high school and graduated college with a Bachelor's degree in tenKsolar  Patient expresses that she enjoys reading fantasy books, listening to music, and exercising  During in-patient stay, patient expresses that she would like to work on her self-esteem, assertiveness/ communication, coping with the symptoms of her illness, relaxation techniques, and obtaining knowledge about her medication  At discharge, patient expressed that she wants to be able to get back to a normal routine for herself  Patient was also agreeable in completing the relapse prevention plan  Some warning signs and thoughts and feelings that indicate that she may need help include panic attacks and SI   Some positive coping skills that she finds helpful includes exercising and writing in a journal  Patient expresses that her mother Nighat Higgins, her father Leonora Cantu, and her sister Marika Peña are supportive and positive people in her life  Patient was agreeable in completing the DERS-18 as well

## 2023-04-19 NOTE — NURSING NOTE
Patient med and meal compliant, withdrawn to self and room most of shift  Patient says she feel's calmer today then when she got here, feels safe here and denies feelings of SI, patient does endorse on going anxiety and depression  Patient becomes tearful with assessment  Denies HI AVH  7 minute safety checks maintained

## 2023-04-19 NOTE — PROGRESS NOTES
"Progress Note - Ryan 32 [address] Kurt 25 y o  female MRN: [id number]  Unit/Bed#: Elidia Her 813-30 Encounter: [id number]    Assessment/Plan   Principal Problem:    Schizoaffective disorder, depressive type ([address] )      Behavior over the last 24 hours: Some improvement  Sleep: normal  Appetite: Fair  Medication side effects: No  ROS: no complaints and all other systems are negative    Sarmad was seen this morning for psychiatric follow-up  She has been visible and participatory in milieu activities  Withdrawn to self and does not socialize with peers  Continues to endorse ongoing anxiety and depression, however denies AVH, paranoia, or \"messages\"  \"The psychosis has been gone, I just feel anxious now  \"  Patient reflected over the past few months and stated \"it all started after I had COVID and had a panic attack in January  I went to see my doctor and he put me on Prozac and then it all went downhill from there  \"  Sarmad stated after most recent discharge, she was unable to focus/concentrate at home and was extremely lethargic  \"I didn't realize how bad it was until I got home, I couldn't do anything  \"  She reports improvement with attention/concentration since discontinuation of Risperdal   Exhibited some improvement with insight and judgment and stated \"I just have to tell you guys more about how I'm feeling  It's just hard  \"  Tearful, but redirectable  Denies SI/HI  Sarmad did not appear internally preoccupied, but rather anxious when relaying her feelings  She has been compliant with medications and agreeable to treatment plan to further taper Zoloft and increase Invega for mood stabilization and depression      Mental Status Evaluation:  Appearance:  age appropriate, casually dressed and Well-groomed   Behavior:  guarded and Cooperative, intermittent eye contact   Speech:  soft   Mood:  anxious, depressed and dysthymic   Affect:  mood-congruent and Dysphoric   Thought Process:  " circumstantial   Associations: perseverative   Thought Content:  Ruminating and negative thoughts   Perceptual Disturbances: Denies AVH, did not appear internally preoccupied   Risk Potential: Suicidal Ideations none  Homicidal Ideations none  Potential for Aggression No   Sensorium:  person, place, time/date and situation   Memory:  recent and remote memory grossly intact   Consciousness:  alert and awake    Attention: attention span and concentration were age appropriate   Insight:  limited   Judgment: limited   Gait/Station: normal gait/station and normal balance   Motor Activity: no abnormal movements     Progress Toward Goals: Slowly improving  Attention and concentration improving  Patient tearful, but able to relay thoughts and feelings  No paranoia or overt psychosis present  Continues to endorse moderate anxiety and depression  Plan is to continue Zoloft taper with plan to decrease to 50 mg PO QD and further titrate Invega for mood stabilization and psychosis  Recommended Treatment: Continue with group therapy, milieu therapy and occupational therapy  Risks, benefits and possible side effects of Medications:   Risks, benefits, and possible side effects of medications explained to patient and patient verbalizes understanding        Medications:   all current active meds have been reviewed, continue current psychiatric medications and current meds:   Current Facility-Administered Medications   Medication Dose Route Frequency   • acetaminophen (TYLENOL) tablet 650 mg  650 mg Oral Q6H PRN   • acetaminophen (TYLENOL) tablet 650 mg  650 mg Oral Q4H PRN   • acetaminophen (TYLENOL) tablet 975 mg  975 mg Oral Q6H PRN   • aluminum-magnesium hydroxide-simethicone (MYLANTA) oral suspension 30 mL  30 mL Oral Q4H PRN   • benztropine (COGENTIN) tablet 1 mg  1 mg Oral Q6H PRN   • [START ON 5/15/2023] cholecalciferol (VITAMIN D3) tablet 1,000 Units  1,000 Units Oral Daily   • cyanocobalamin (VITAMIN B-12) tablet 500 mcg  500 mcg Oral Daily   • hydrOXYzine HCL (ATARAX) tablet 50 mg  50 mg Oral Q6H PRN Max 4/day    Or   • diphenhydrAMINE (BENADRYL) injection 50 mg  50 mg Intramuscular Q6H PRN   • ergocalciferol (VITAMIN D2) capsule 50,000 Units  50,000 Units Oral Weekly   • hydrOXYzine HCL (ATARAX) tablet 100 mg  100 mg Oral Q6H PRN Max 4/day    Or   • LORazepam (ATIVAN) injection 2 mg  2 mg Intramuscular Q6H PRN   • hydrOXYzine HCL (ATARAX) tablet 25 mg  25 mg Oral Q6H PRN Max 4/day   • melatonin tablet 6 mg  6 mg Oral HS   • OLANZapine (ZyPREXA) tablet 10 mg  10 mg Oral Q3H PRN Max 3/day    Or   • OLANZapine (ZyPREXA) IM injection 10 mg  10 mg Intramuscular Q3H PRN Max 3/day   • OLANZapine (ZyPREXA) tablet 5 mg  5 mg Oral Q3H PRN Max 6/day    Or   • OLANZapine (ZyPREXA) IM injection 5 mg  5 mg Intramuscular Q3H PRN Max 6/day   • OLANZapine (ZyPREXA) tablet 2 5 mg  2 5 mg Oral Q3H PRN Max 8/day   • paliperidone (INVEGA) 24 hr tablet 3 mg  3 mg Oral Daily   • polyethylene glycol (MIRALAX) packet 17 g  17 g Oral Daily PRN   • sertraline (ZOLOFT) tablet 75 mg  75 mg Oral Daily   • traZODone (DESYREL) tablet 100 mg  100 mg Oral HS PRN     Labs: I have personally reviewed all pertinent laboratory/tests results     CBC:   Lab Results   Component Value Date    WBC 5 83 04/17/2023    RBC 4 14 04/17/2023    HGB 13 6 04/17/2023    HCT 42 3 04/17/2023     (H) 04/17/2023     04/17/2023    MCH 32 9 04/17/2023    MCHC 32 2 04/17/2023    RDW 12 0 04/17/2023    MPV 10 5 04/17/2023    NEUTROABS 4 39 04/17/2023     CMP:   Lab Results   Component Value Date    SODIUM 136 04/17/2023    K 4 3 04/17/2023     04/17/2023    CO2 27 04/17/2023    AGAP 5 04/17/2023    BUN 17 04/17/2023    CREATININE 0 67 04/17/2023    GLUC 131 04/17/2023    GLUF 88 03/28/2023    CALCIUM 8 9 04/17/2023    AST 17 04/17/2023    ALT 25 04/17/2023    ALKPHOS 33 (L) 04/17/2023    TP 6 6 04/17/2023    ALB 4 1 04/17/2023    TBILI 0 91 04/17/2023 EGFR 118 04/17/2023     Drug Screen:   Lab Results   Component Value Date    AMPMETHUR Negative 04/17/2023    BARBTUR Negative 04/17/2023    BDZUR Positive (A) 04/17/2023    THCUR Negative 04/17/2023    COCAINEUR Negative 04/17/2023    METHADONEUR Negative 04/17/2023    OPIATEUR Negative 04/17/2023    PCPUR Negative 04/17/2023     EKG   Lab Results   Component Value Date    VENTRATE 61 03/27/2023    ATRIALRATE 61 03/27/2023    PRINT 140 03/27/2023    QRSDINT 88 03/27/2023    QTINT 396 03/27/2023    QTCINT 398 03/27/2023    PAXIS 71 03/27/2023    QRSAXIS 59 03/27/2023    TWAVEAXIS 62 03/27/2023       Counseling / Coordination of Care  Total floor / unit time spent today 30 minutes  Greater than 50% of total time was spent with the patient and / or family counseling and / or coordination of care   A description of the counseling / coordination of care: Medication education, treatment plan, supportive therapy, safety planning

## 2023-04-19 NOTE — NURSING NOTE
Patient was calm, quiet, cooperative, and withdrawn  Flat affect  Spent most of the time in her room  Patient has poor eye contact  patient stated that she had worsening anxiety, panic attacks, and suicidal thoughts before going to ED and she was thinking that it might have been because of her medications  Patient denies SI/HI/AVH, anxiety, and depression and stated that she feels safe here  Compliant with medications  Did not endorsed any pain  Q 7 minutes safety checks maintained and continued

## 2023-04-19 NOTE — PROGRESS NOTES
04/19/23 0762   Activity/Group Checklist   Group   (Morning Meeting and Coffee)   Attendance Attended   Attendance Duration (min) 46-60   Interactions Interacted appropriately   Affect/Mood Appropriate;Calm   Goals Achieved Identified feelings; Discussed coping strategies; Able to listen to others; Able to engage in interactions; Able to reflect/comment on own behavior;Able to manage/cope with feelings

## 2023-04-19 NOTE — NURSING NOTE
Patient slept all night  No distress or labored breathing noted during rounds  Q 7 minutes safety checks maintained

## 2023-04-19 NOTE — H&P
Aurora Hicks#  SUP:[id number] F  DBQ:[id number]    TZX:[id number]  Adm Date: 4/18/2023 1328  1:28 PM   ATT PHY: Edie Lindsay, [address] [facility] [facility] [address] [address]         Chief Complaint: Worsening anxiety and depression along with suicidal thoughts    History of Presenting Illness: Bryan Vargas is a(n) 25y o  year old female was admitted through ED where she presented with worsening anxiety and depression along with suicidal thoughts  Patient examined at bedside  Patient denied any active suicidal homicidal ideations  No Known Allergies    Current Facility-Administered Medications on File Prior to Encounter   Medication Dose Route Frequency Provider Last Rate Last Admin   • [DISCONTINUED] melatonin tablet 3 mg  3 mg Oral HS Dena Steven, DO   3 mg at 04/17/23 2000   • [DISCONTINUED] risperiDONE (RisperDAL) tablet 2 mg  2 mg Oral BID Floydene Marina Mathias CRNP       • [DISCONTINUED] sertraline (ZOLOFT) tablet 150 mg  150 mg Oral Daily Floydene Marina Mathias CRNP   150 mg at 04/17/23 1518     Current Outpatient Medications on File Prior to Encounter   Medication Sig Dispense Refill   • [START ON 5/15/2023] cholecalciferol (VITAMIN D3) 1,000 units tablet Take 1 tablet (1,000 Units total) by mouth daily Do not start before May 15, 2023  (Patient not taking: Reported on 4/18/2023 Do not start before May 15, 2023 ) 30 tablet 0   • cyanocobalamin (VITAMIN B-12) 500 MCG tablet Take 1 tablet (500 mcg total) by mouth daily Do not start before April 10, 2023   (Patient not taking: Reported on 4/18/2023) 30 tablet 0   • ergocalciferol (VITAMIN D2) 50,000 units Take 1 capsule (50,000 Units total) by mouth once a week for 10 doses (Patient not taking: Reported on 4/18/2023) 10 capsule 0   • melatonin 3 mg Take 1 tablet (3 mg total) by mouth daily at bedtime (Patient not taking: Reported on 4/18/2023) 30 tablet 0   • risperiDONE (RisperDAL) 2 mg tablet Take 1 tablet (2 mg total) by mouth 2 (two) times a day (Patient not taking: Reported on 2023) 60 tablet 0   • sertraline (ZOLOFT) 50 mg tablet Take 3 tablets (150 mg total) by mouth daily 90 tablet 0       Active Ambulatory Problems     Diagnosis Date Noted   • Schizoaffective disorder, depressive type ([facility] ) 2023   • JAIME (generalized anxiety disorder) 2023     Resolved Ambulatory Problems     Diagnosis Date Noted   • MDD (major depressive disorder), single episode, severe with psychotic features ([place] [address] ) 2023     Past Medical History:   Diagnosis Date   • Anxiety    • Depression    • Panic attack    • Psychiatric disorder        History reviewed  No pertinent surgical history  Social History:   Social History     Socioeconomic History   • Marital status: Single     Spouse name: None   • Number of children: None   • Years of education: None   • Highest education level: None   Occupational History   • None   Tobacco Use   • Smoking status: Former     Types: Cigarettes     Quit date: 3/17/2023     Years since quittin 0     Passive exposure: Past   • Smokeless tobacco: Never   Vaping Use   • Vaping Use: Never used   Substance and Sexual Activity   • Alcohol use: Not Currently   • Drug use: Not Currently   • Sexual activity: Not Currently   Other Topics Concern   • None   Social History Narrative   • None     Social Determinants of Health     Financial Resource Strain: Not on file   Food Insecurity: Not on file   Transportation Needs: Not on file   Physical Activity: Not on file   Stress: Not on file   Social Connections: Not on file   Intimate Partner Violence: Not on file   Housing Stability: Not on file       Family History:   Family History   Problem Relation Age of Onset   • No Known Problems Mother    • No Known Problems Father    • Lymphoma Maternal Grandmother    • Diabetes Maternal Grandmother    • Prostate cancer Maternal Grandfather        Review of Systems   Musculoskeletal: Positive for arthralgias  "  Neurological: Positive for headaches  Psychiatric/Behavioral: Positive for sleep disturbance  All other systems reviewed and are negative  Physical Exam   Vitals: Blood pressure 106/63, pulse 70, temperature (!) 95 1 °F (35 1 °C), temperature source Tympanic, resp  rate 17, height 5' 6\" (1 676 m), weight 62 8 kg (138 lb 6 4 oz), last menstrual period 04/15/2023, SpO2 96 %  ,Body mass index is 22 34 kg/m²  Constitutional: Awake and Alert  Well-developed and well-nourished  No distress  HENT: PERR,EOMI, conjunctiva normal  Head: Normocephalic and atraumatic  Mouth/Throat: Oropharynx is clear and moist     Eyes: Conjunctivae and EOM are normal  Pupils are equal, round, and reactive to light  Right and left eye exhibits no discharge  Neck: Neck supple  No tracheal deviation present  No thyromegaly present  Cardiovascular: Normal rate, regular rhythm and normal heart sounds  Exam reveals no friction rub  No murmur heard  Pulmonary/Chest: Effort normal and breath sounds normal  No respiratory distress  She has no wheezes  Abdominal: Soft  Bowel sounds are normal  She exhibits no distension  There is no tenderness  There is no rebound and no guarding  Neurological: Cranial Nerves grossly intact  No sensory deficit  Coordination normal    Musculoskeletal:   Nontender spine  Skin: Skin is warm and dry  No rash noted  No diaphoresis  No erythema  No edema  No cyanosis  Balwinder Story is a(n) 25y o  year old female with Schizoaffective DO Depressive type     1  Allergic rhinitis   Stable  2  Insomnia   Patient is on melatonin  3  Arthralgia/headaches   Tylenol as needed  4  New vitamin D deficiency   I will put the patient on vitamin D bolus doses for 10 weeks followed by vitamin D3 1000 units daily  5  Vitamin B12 deficiency  Patient started on vitamin B12 supplements  6  Hypotension  Asymptomatic  Encouraged adequate hydration    7  Psych with MDD   This is being managed " by the psych team       Prognosis: Fair      Discharge Plan: In progress      Advanced Directives: I have discussed in detail with the patient the advanced directives  The patient does not have an appointed POA and does not have [place] [address] will   Patient's emergency contact is her sister, Isacc Izquierdo whose number is [phone]   [facility] [place] efforts with the patient, the patient wishes to be FULL CODE      I have spent more than 50 minutes gathering data, doing physical examination, and discussing the advanced directives, which was witnessed by caring staff

## 2023-04-19 NOTE — PROGRESS NOTES
04/19/23   Team Meeting   Meeting Type Daily Rounds   Team Members Present   Team Members Present Physician;Nurse;   Physician Team Member Dr Nahum Grajeda MD; HOSP DR ESTELLE LOPEZ, [address] [place] [address]   Nursing Team Member [address]; [facility]   Social Work Team Member MONI Carrasquillo; [place], [place]   Patient/Family Present   Patient Present No   Patient's Family Present No     [place] [address], SI, poor eye contact, withdrawn, meal/med comp      This patient is a 30 day readmission and was most recently discharged on: 3/27/23    The previous discharge plan was: [facility]mt    The [address] [place] [address] Readmission Risk score is: 15    The identified triggers/events leading up to this admission include: tapering risperdole    Initial Plans for this admission (and who will be involved in treatment and discharge planning) include: tx team will work with pt, pt's family to complete dc plan, medication adjustments will be completed on unit, php referral will be made

## 2023-04-20 PROBLEM — F39 MOOD DISORDER WITH PSYCHOSIS (HCC): Status: RESOLVED | Noted: 2023-03-11 | Resolved: 2023-04-20

## 2023-04-20 PROBLEM — F39 MOOD DISORDER WITH PSYCHOSIS (HCC): Status: ACTIVE | Noted: 2023-03-11

## 2023-04-20 PROBLEM — F25.1 SCHIZOAFFECTIVE DISORDER, DEPRESSIVE TYPE (HCC): Status: RESOLVED | Noted: 2023-03-11 | Resolved: 2023-04-20

## 2023-04-20 RX ADMIN — CYANOCOBALAMIN TAB 500 MCG 500 MCG: 500 TAB at 09:08

## 2023-04-20 RX ADMIN — Medication 6 MG: at 21:34

## 2023-04-20 RX ADMIN — SERTRALINE HYDROCHLORIDE 75 MG: 50 TABLET ORAL at 09:08

## 2023-04-20 RX ADMIN — PALIPERIDONE 3 MG: 3 TABLET, EXTENDED RELEASE ORAL at 09:08

## 2023-04-20 NOTE — PLAN OF CARE
Problem: Alteration in Thoughts and Perception  Goal: Verbalize thoughts and feelings  Description: Interventions:  - Promote a nonjudgmental and trusting relationship with the patient through active listening and therapeutic communication  - Assess patient's level of functioning, behavior and potential for risk  - Engage patient in 1 on 1 interactions  - Encourage patient to express fears, feelings, frustrations, and discuss symptoms    - [place] patient to reality, help patient recognize reality-based thinking   - Administer medications as ordered and assess for potential side effects  - Provide the patient education related to the signs and symptoms of the illness and desired effects of prescribed medications  Outcome: Progressing

## 2023-04-20 NOTE — PROGRESS NOTES
04/20/23 1330   Activity/Group Checklist   Group   (Pet Therapy)   Attendance Attended   Attendance Duration (min) 31-45   Interactions Interacted appropriately   Affect/Mood Appropriate;Calm   Goals Achieved Identified feelings; Able to listen to others; Able to engage in interactions

## 2023-04-20 NOTE — NURSING NOTE
Patient was pleasant and cooperative  Patient was tearful and anxious this morning  Staff support provided  Q 7 minute safety checks maintained  Patient denied SI/HI  Patient was better later in the day  Patient reported having a bad morning and said she was sad and anxious  Patient reports feeling better  Patient compliant with medications and groups  Staff will continue to monitor and support

## 2023-04-20 NOTE — PROGRESS NOTES
04/20/23 1000   Activity/Group Checklist   Group   (Self Reflection Art Therapy Processing)   Attendance Attended   Attendance Duration (min) Greater than 60   Interactions Interacted appropriately   Affect/Mood Appropriate   Goals Achieved Identified feelings; Discussed coping strategies; Identified resources and support systems; Able to listen to others; Able to engage in interactions; Able to reflect/comment on own behavior;Able to manage/cope with feelings; Able to recieve feedback; Able to give feedback to another

## 2023-04-20 NOTE — SOCIAL WORK
Sw met with pt in exam room for check in  Pt denies current SI/HI/AVH, endorses moderate anx/dep related to unit stimuli  Pt presents tearful, with poor eye contact, faustino, dc focused  Pt reports good phone calls with family, med compliance  Sw provided support, reassurance to pt

## 2023-04-20 NOTE — PROGRESS NOTES
"Progress Note - Catrachita Torrez [age] y o  female MRN: [id number]  Unit/Bed#: [facility] 843-85 Encounter: [id number]    Assessment/Plan   Active Problems:    * No active hospital problems  *      Behavior over the last 24 hours: Slowly improving  Sleep: normal  Appetite: Fair  Medication side effects: No  ROS: no complaints and all other systems are negative    [place] was seen for psychiatric follow-up with attending psychiatrist   She continues to endorse anxiety and depression  Reports \"I've been dealing with depression for a long time;  probably since high school  \"  Identifies worsening anxiety and panic attacks since 2019 when her nephew had a seizure, \"he's okay now though  \"  Denies current thoughts of suicide and able to contract for safety should thoughts return  No longer receiving \"messages\" and believes the psychosis was triggered by Prozac  Identified difficulty remembering last hospitalization because \"I felt fuzzy on the Risperdal \"  Describes feeling more \"clearheaded\" now and denies any side effects from [place]  Remains isolative to self, however does attend milieu activities  She denied any sleep disturbance but did report having a \"rough morning  \"  Reports she is \"sad\" to be in the hospital again and became tearful  She then added \"it was really scary because I couldn't trust my own thoughts before I came back here  \"    Mental Status Evaluation:  Appearance:  age appropriate, casually dressed and Well-groomed   Behavior:  Isolative, calm, cooperative, intermittent eye contact tearful at times   Speech:  soft and Slow, delayed   Mood:  anxious, depressed and sad   Affect:  blunted and mood-congruent   Thought Process:  circumstantial   Associations: perseverative   Thought Content:  Negative and ruminating thoughts   Perceptual Disturbances: Denies AVH, appeared intermittently preoccupied   Risk Potential: Suicidal Ideations none at present, able to contract for safety should " thoughts return  Homicidal Ideations none  Potential for Aggression No   Sensorium:  person, place, time/date and situation   Memory:  recent and remote memory grossly intact   Consciousness:  alert and awake    Attention: attention span appeared shorter than expected for age   Insight:  limited   Judgment: limited   Gait/Station: normal gait/station and normal balance   Motor Activity: no abnormal movements     Progress Toward Goals: Slowly improving  Denies SI and able to contract for safety should thoughts return  Appeared intermittently preoccupied today and continues to endorse moderate anxiety and depression  Thoughts are negative and ruminating  Will decrease Zoloft 50 mg PO QD and continue remainder of psychotropic regimen as ordered  Plan is to further titrate Invega for mood stabilization and psychosis  Will also consider addition of Lamictal for treatment of mood with depressive symptoms  Recommended Treatment: Continue with group therapy, milieu therapy and occupational therapy  Risks, benefits and possible side effects of Medications:   Risks, benefits, and possible side effects of medications explained to patient and patient verbalizes understanding        Medications:  Decrease Zoloft 50 mg PO QD  all current active meds have been reviewed, continue current psychiatric medications and current meds:   Current Facility-Administered Medications   Medication Dose Route Frequency   • acetaminophen (TYLENOL) tablet 650 mg  650 mg Oral Q6H PRN   • acetaminophen (TYLENOL) tablet 650 mg  650 mg Oral Q4H PRN   • acetaminophen (TYLENOL) tablet 975 mg  975 mg Oral Q6H PRN   • aluminum-magnesium hydroxide-simethicone (MYLANTA) oral suspension 30 mL  30 mL Oral Q4H PRN   • benztropine (COGENTIN) tablet 1 mg  1 mg Oral Q6H PRN   • [START ON 5/15/2023] cholecalciferol (VITAMIN D3) tablet 1,000 Units  1,000 Units Oral Daily   • cyanocobalamin (VITAMIN B-12) tablet 500 mcg  500 mcg Oral Daily   • hydrOXYzine HCL (ATARAX) tablet 50 mg  50 mg Oral Q6H PRN Max 4/day    Or   • diphenhydrAMINE (BENADRYL) injection 50 mg  50 mg Intramuscular Q6H PRN   • ergocalciferol (VITAMIN D2) capsule 50,000 Units  50,000 Units Oral Weekly   • hydrOXYzine HCL (ATARAX) tablet 100 mg  100 mg Oral Q6H PRN Max 4/day    Or   • LORazepam (ATIVAN) injection 2 mg  2 mg Intramuscular Q6H PRN   • hydrOXYzine HCL (ATARAX) tablet 25 mg  25 mg Oral Q6H PRN Max 4/day   • melatonin tablet 6 mg  6 mg Oral HS   • OLANZapine (ZyPREXA) tablet 10 mg  10 mg Oral Q3H PRN Max 3/day    Or   • OLANZapine (ZyPREXA) IM injection 10 mg  10 mg Intramuscular Q3H PRN Max 3/day   • OLANZapine (ZyPREXA) tablet 5 mg  5 mg Oral Q3H PRN Max 6/day    Or   • OLANZapine (ZyPREXA) IM injection 5 mg  5 mg Intramuscular Q3H PRN Max 6/day   • OLANZapine (ZyPREXA) tablet 2 5 mg  2 5 mg Oral Q3H PRN Max 8/day   • paliperidone (INVEGA) 24 hr tablet 3 mg  3 mg Oral Daily   • polyethylene glycol (MIRALAX) packet 17 g  17 g Oral Daily PRN   • [START ON 4/21/2023] sertraline (ZOLOFT) tablet 50 mg  50 mg Oral Daily   • traZODone (DESYREL) tablet 100 mg  100 mg Oral HS PRN     Labs: I have personally reviewed all pertinent laboratory/tests results     CBC:   Lab Results   Component Value Date    WBC 5 83 04/17/2023    RBC 4 14 04/17/2023    HGB 13 6 04/17/2023    HCT 42 3 04/17/2023     (H) 04/17/2023     04/17/2023    MCH 32 9 04/17/2023    MCHC 32 2 04/17/2023    RDW 12 0 04/17/2023    MPV 10 5 04/17/2023    NEUTROABS 4 39 04/17/2023     CMP:   Lab Results   Component Value Date    SODIUM 136 04/17/2023    K 4 3 04/17/2023     04/17/2023    CO2 27 04/17/2023    AGAP 5 04/17/2023    BUN 17 04/17/2023    CREATININE 0 67 04/17/2023    GLUC 131 04/17/2023    GLUF 88 03/28/2023    CALCIUM 8 9 04/17/2023    AST 17 04/17/2023    ALT 25 04/17/2023    ALKPHOS 33 (L) 04/17/2023    TP 6 6 04/17/2023    ALB 4 1 04/17/2023    TBILI 0 91 04/17/2023    EGFR 118 04/17/2023     Drug Screen:   Lab Results   Component Value Date    AMPMETHUR Negative 04/17/2023    BARBTUR Negative 04/17/2023    BDZUR Positive (A) 04/17/2023    THCUR Negative 04/17/2023    COCAINEUR Negative 04/17/2023    METHADONEUR Negative 04/17/2023    OPIATEUR Negative 04/17/2023    PCPUR Negative 04/17/2023       Counseling / Coordination of Care  Total floor / unit time spent today 35 minutes  Greater than 50% of total time was spent with the patient and / or family counseling and / or coordination of care   A description of the counseling / coordination of care: Medication education, treatment plan, supportive therapy, safety planning, family phone call with treatment team

## 2023-04-20 NOTE — NURSING NOTE
Patient slept well during Q 7 minute checks  Easy non labored breathing noted  No changes in medical condition  No behaviors noted  Monitoring continues

## 2023-04-20 NOTE — SOCIAL WORK
Sw contacted [address] [employer], Mino Adore already canceled pt's med mgmt appt next week, requests call at discharge to reschedule

## 2023-04-20 NOTE — SOCIAL WORK
Catarina Mcclellan (mother) [phone] requested follow up call from olaf Michael provided education on php referral, coping mechanisms upon dc  Kecia Garg states Thursday at 5am she received a text from an unknown number, reversed looked up name, Google searched name of man who lives in [place], has criminal record  Michelle Callejas is concerned pt met this individual on unit or at work, is concerned of influence on pt's tx  Olaf encouraged Michelle Callejas to talk to pt regarding this  Call ended mutually

## 2023-04-20 NOTE — NURSING NOTE
Patient withdrawn to room  Patient presents with a flat effect but is calm and cooperative  Patient  Tearful with assessment but states she does feel safe here  Denies SI,HI,AVH but does endorse anxiety and depression  Safety checks continue Q 7 minutes

## 2023-04-20 NOTE — ESCALATED TEAM TX
Team Meeting Note    Patient name Elaina Greyms  Location [place] [address]/U [id number] MRN [id number]  : 1995 Date 2023       Team Meeting  Meeting Type: Escalated Tx Team Meeting    Team Members Present  Team Members Present: Physician,   Physician Team Member: Dr Eron Lagunas MD  Social Work Team Member: [facility]  Physician Team Member: Анна Douglass  Patient/Family Present  Patient Present: Yes  Patient's Family Present: Yes  Family Relationship: Parent  Parent: Dina Schaefer [phone]        , providers, pt called pt's motherDina to provide update on pt's diagnosis, treatment, progress, care plan  Provider is treating pt's depression, mood disorder due to hx of anxiety, depression, panic attacks; provider discussed medication adjustments with mother, pt understanding, agreeable to tx plan  Provider reviewed pt's lab work for thyroid concerns and provided insight on genomic testing and inability to complete this on unit  Call ended mutually after 10 minutes

## 2023-04-20 NOTE — PROGRESS NOTES
04/20/23   Team Meeting   Meeting Type Daily Rounds   Team Members Present   Team Members Present Physician;Nurse;   Physician Team Member Dr Екатерина Scales MD; HOSP DR ESTELLE LOPEZ, [address] [place] [address]   Nursing Team Member [facility]; Arelis Enriquez [facility]   Social Work Team Member MONI Prajapati; [place], [place]   Patient/Family Present   Patient Present No   Patient's Family Present No     Anx, dep, calm, cooperative, flat affect, withdrawn, light headed, slept

## 2023-04-20 NOTE — PROGRESS NOTES
04/20/23 0730   Activity/Group Checklist   Group   (Morning Meeting and Coffee)   Attendance Attended   Attendance Duration (min) 46-60   Interactions Interacted appropriately   Affect/Mood Appropriate   Goals Achieved Identified feelings; Able to listen to others; Able to engage in interactions

## 2023-04-20 NOTE — PROGRESS NOTES
04/20/23 1300   Team Meeting   Meeting Type Escalated Tx Team Meeting   Team Members Present   Team Members Present Physician;   Physician Team Member Dr Luz Leung MD   Social Work Team Member Triston Mcdermott [place]   Patient/Family Present   Patient Present Yes   Patient's Family Present Yes   Family Relationship Parent   Parent Kylie Earing [phone]

## 2023-04-21 RX ADMIN — SERTRALINE HYDROCHLORIDE 50 MG: 50 TABLET ORAL at 08:40

## 2023-04-21 RX ADMIN — CYANOCOBALAMIN TAB 500 MCG 500 MCG: 500 TAB at 08:40

## 2023-04-21 RX ADMIN — PALIPERIDONE 3 MG: 3 TABLET, EXTENDED RELEASE ORAL at 08:40

## 2023-04-21 RX ADMIN — Medication 6 MG: at 20:46

## 2023-04-21 NOTE — NURSING NOTE
"Complaints of \"feeling light headed\" Pt endorses same symptom complaints prior to admission  Provided education  Pt verbalized understanding  Orthostatic BP - Lying 95/54 BP 57 HR Sitting 92/59 BP 61 HR Standing 92/62 BP 81 HR  No symptom complaints  At time of assess     "

## 2023-04-21 NOTE — PROGRESS NOTES
04/21/23   Team Meeting   Meeting Type Daily Rounds   Team Members Present   Team Members Present Physician;Nurse;   Physician Team Member Dr Debora Herrera MD; HOSP DR ESTELLE LOPEZ, [address] [place] [address]   Nursing Team Member Klaudia Tran RN; [place] [facility]   Social Work Team Member MONI Nicole; Ricardo Cardoso [place]   Patient/Family Present   Patient Present No   Patient's Family Present No     Dizzy, tired, light headed, isolative, quiet, medical board for BP, meds adjusted

## 2023-04-21 NOTE — PROGRESS NOTES
"Progress Note - Aurora Vences 25 y o  female MRN: [id number]    Unit/Bed#: [facility] 182-81 Encounter: [id number]        Subjective:   Patient seen and examined at bedside after reviewing the chart and discussing the case with the caring staff  Patient examined at bedside  Patient complaining of lightheadedness and concerned about her low blood pressures  Discussed with patient regarding adding medicaton to help increase blood pressure which may improve lightheadedness  She feels lightheadedness is coming from Zoloft and states \"I don't feel like starting something new right now not knowing if it's from the Zoloft  \"  Patient states she is staying hydrated and appetite is good  Denies headache, vision changes, nausea, vomiting, chest pain, shortness of breath  Physical Exam   Vitals: Blood pressure (!) 87/54, pulse 67, temperature 97 5 °F (36 4 °C), temperature source Tympanic, resp  rate 18, height 5' 6\" (1 676 m), weight 62 8 kg (138 lb 6 4 oz), last menstrual period 04/15/2023, SpO2 100 %  ,Body mass index is 22 34 kg/m²  Constitutional: Patient in no acute distress  HEENT: PERR, EOMI, MMM  Cardiovascular: Normal rate and regular rhythm  Pulmonary/Chest: Effort normal and breath sounds normal    Abdomen: Soft, + BS, NT  Assessment/Plan:  Aurora Vences is a(n) 25y o  year old female with schizoaffective disorder depressive type  1  Allergic rhinitis   Stable  2  Insomnia   Patient is on melatonin  3  Arthralgia/headaches   Tylenol as needed  4  Vitamin D deficiency  Patient is on vitamin D bolus doses for 10 weeks followed by vitamin D3 1000 units daily  5  Vitamin B12 deficiency  Patient is on vitamin B12 supplements  6  Hypotension  Complaining of lightheadedness  States she is staying hydrated  She does not want to start new medication at this time and wants to wait few days  Will discuss again with patient tomorrow    Reviewed blood pressures from last admission which were " also on low side but no complaints of lightheadedness at that time  Spoke with patient about slow position changes  The patient was discussed with Dr Michelle Rodney and he is in agreement with the above note

## 2023-04-21 NOTE — PROGRESS NOTES
"Progress Note - Catrachita Pickens Sheridan [age] y o  female MRN: [id number]  Unit/Bed#: [facility] 757-69 Encounter: [id number]    Assessment/Plan   Principal Problem:    Mood disorder with psychosis ([address] )      Behavior over the last 24 hours:  unchanged  Sleep: normal  Appetite: normal  Medication side effects: No  ROS: sedation, Intermittent dizziness and all other systems are negative    [facility] was seen today for psychiatric follow-up  She has been withdrawn to her room and continues to endorse moderate anxiety and depression  Tearful at times  Reports awakening this morning and feeling sad with fleeting thoughts of SI, no plan  Able to contract for safety should thoughts return  Denies AVH or any ideas of reference  Continues to state \"the psychosis has been gone  \"  Reports symptoms of anhedonia, low mood, amotivation, and helplessness  Also endorses intermittent episodes of dizziness and states \"my blood pressure has been low  \"  Medical team aware  [facility] denied any complaints of headache, shortness of breath, or chest pain  Medication education was provided and she was receptive  Slept undisturbed throughout the night and appetite is adequate      Mental Status Evaluation:  Appearance:  age appropriate, casually dressed and Well-groomed, resting in bed   Behavior:  withdrawn, cooperative, isolative to self, tearful   Speech:  soft   Mood:  depressed and sad   Affect:  mood-congruent and Dysphoric   Thought Process:  circumstantial   Associations: perseverative   Thought Content:  negative thinking, ruminations   Perceptual Disturbances: Denied AVH, did not appear internally preoccupied   Risk Potential: Suicidal Ideations fleeting thoughts with no plan this morning, denies currently, able to contract for safety  Homicidal Ideations none  Potential for Aggression No   Sensorium:  person, place, time/date and situation   Memory:  recent and remote memory grossly intact   Consciousness:  alert and " awake    Attention: attention span appeared shorter than expected for age   Insight:  limited   Judgment: limited   Gait/Station: normal gait/station and normal balance   Motor Activity: no abnormal movements     Progress Toward Goals: Unchanged  Continues to endorse moderate anxiety and depression with fleeting thoughts of SI, no plan, able to contract for safety  Exhibits no signs or symptoms of psychosis or paranoia  Patient concerned regarding low BP and intermittent dizziness; medical team aware and monitoring  Discussed with patient that perhaps tapering of Zoloft could be contributing to dizziness and will maintain at current dose over the next few days before resuming taper  Will also consider switching dosing of Invega to HS dosing should patient continue to report daytime lethargy/dizziness  Otherwise, continue with current psychotropic regimen  Will consider initiation of Lamictal for further treatment of depressive symptoms and mood stabilization continue  Recommended Treatment: Continue with group therapy, milieu therapy and occupational therapy  Risks, benefits and possible side effects of Medications:   Risks, benefits, and possible side effects of medications explained to patient and patient verbalizes understanding        Medications:   all current active meds have been reviewed, continue current psychiatric medications and current meds:   Current Facility-Administered Medications   Medication Dose Route Frequency   • acetaminophen (TYLENOL) tablet 650 mg  650 mg Oral Q6H PRN   • acetaminophen (TYLENOL) tablet 650 mg  650 mg Oral Q4H PRN   • acetaminophen (TYLENOL) tablet 975 mg  975 mg Oral Q6H PRN   • aluminum-magnesium hydroxide-simethicone (MYLANTA) oral suspension 30 mL  30 mL Oral Q4H PRN   • benztropine (COGENTIN) tablet 1 mg  1 mg Oral Q6H PRN   • [START ON 5/15/2023] cholecalciferol (VITAMIN D3) tablet 1,000 Units  1,000 Units Oral Daily   • cyanocobalamin (VITAMIN B-12) tablet 500 mcg  500 mcg Oral Daily   • hydrOXYzine HCL (ATARAX) tablet 50 mg  50 mg Oral Q6H PRN Max 4/day    Or   • diphenhydrAMINE (BENADRYL) injection 50 mg  50 mg Intramuscular Q6H PRN   • ergocalciferol (VITAMIN D2) capsule 50,000 Units  50,000 Units Oral Weekly   • hydrOXYzine HCL (ATARAX) tablet 100 mg  100 mg Oral Q6H PRN Max 4/day    Or   • LORazepam (ATIVAN) injection 2 mg  2 mg Intramuscular Q6H PRN   • hydrOXYzine HCL (ATARAX) tablet 25 mg  25 mg Oral Q6H PRN Max 4/day   • melatonin tablet 6 mg  6 mg Oral HS   • OLANZapine (ZyPREXA) tablet 10 mg  10 mg Oral Q3H PRN Max 3/day    Or   • OLANZapine (ZyPREXA) IM injection 10 mg  10 mg Intramuscular Q3H PRN Max 3/day   • OLANZapine (ZyPREXA) tablet 5 mg  5 mg Oral Q3H PRN Max 6/day    Or   • OLANZapine (ZyPREXA) IM injection 5 mg  5 mg Intramuscular Q3H PRN Max 6/day   • OLANZapine (ZyPREXA) tablet 2 5 mg  2 5 mg Oral Q3H PRN Max 8/day   • paliperidone (INVEGA) 24 hr tablet 3 mg  3 mg Oral Daily   • polyethylene glycol (MIRALAX) packet 17 g  17 g Oral Daily PRN   • sertraline (ZOLOFT) tablet 50 mg  50 mg Oral Daily   • traZODone (DESYREL) tablet 100 mg  100 mg Oral HS PRN     Labs: I have personally reviewed all pertinent laboratory/tests results     CBC:   Lab Results   Component Value Date    WBC 5 83 04/17/2023    RBC 4 14 04/17/2023    HGB 13 6 04/17/2023    HCT 42 3 04/17/2023     (H) 04/17/2023     04/17/2023    MCH 32 9 04/17/2023    MCHC 32 2 04/17/2023    RDW 12 0 04/17/2023    MPV 10 5 04/17/2023    NEUTROABS 4 39 04/17/2023     CMP:   Lab Results   Component Value Date    SODIUM 136 04/17/2023    K 4 3 04/17/2023     04/17/2023    CO2 27 04/17/2023    AGAP 5 04/17/2023    BUN 17 04/17/2023    CREATININE 0 67 04/17/2023    GLUC 131 04/17/2023    GLUF 88 03/28/2023    CALCIUM 8 9 04/17/2023    AST 17 04/17/2023    ALT 25 04/17/2023    ALKPHOS 33 (L) 04/17/2023    TP 6 6 04/17/2023    ALB 4 1 04/17/2023    TBILI 0 91 04/17/2023 EGFR 118 04/17/2023     EKG   Lab Results   Component Value Date    VENTRATE 61 03/27/2023    ATRIALRATE 61 03/27/2023    PRINT 140 03/27/2023    QRSDINT 88 03/27/2023    QTINT 396 03/27/2023    QTCINT 398 03/27/2023    PAXIS 71 03/27/2023    QRSAXIS 59 03/27/2023    TWAVEAXIS 62 03/27/2023       Counseling / Coordination of Care  Total floor / unit time spent today 30 minutes  Greater than 50% of total time was spent with the patient and / or family counseling and / or coordination of care   A description of the counseling / coordination of care: medication education, treatment plan, supportive therapy, safety planning

## 2023-04-21 NOTE — PLAN OF CARE
Problem: Ineffective Coping  Goal: Participates in unit activities  Description: Interventions:  - Provide therapeutic environment   - Provide required programming   - Redirect inappropriate behaviors   Outcome: Progressing   Quiet, socially withdrawn, though has made an effort to attend and participate in RT groups

## 2023-04-21 NOTE — PROGRESS NOTES
"Progress Note - Jumana Galloway 25 y o  female MRN: [id number]    Unit/Bed#: [facility] 989-64 Encounter: [id number]        Subjective:   Patient seen and examined at bedside after reviewing the chart and discussing the case with the caring staff  Patient examined at bedside  Patient has no acute concerns  Physical Exam   Vitals: Blood pressure 91/52, pulse 65, temperature 97 9 °F (36 6 °C), temperature source Tympanic, resp  rate 16, height 5' 6\" (1 676 m), weight 62 8 kg (138 lb 6 4 oz), last menstrual period 04/15/2023, SpO2 100 %  ,Body mass index is 22 34 kg/m²  Constitutional: Patient in no acute distress  HEENT: PERR, EOMI, MMM  Cardiovascular: Normal rate and regular rhythm  Pulmonary/Chest: Effort normal and breath sounds normal    Abdomen: Soft, + BS, NT  Assessment/Plan:  Jumana Galloway is a(n) 25y o  year old female with schizoaffective disorder depressive type  1  Allergic rhinitis   Stable  2  Insomnia   Patient is on melatonin  3  Arthralgia/headaches   Tylenol as needed  4  Vitamin D deficiency  Patient is on vitamin D bolus doses for 10 weeks followed by vitamin D3 1000 units daily  5  Vitamin B12 deficiency  Patient is on vitamin B12 supplements  6  Hypotension  Asymptomatic  Encouraged adequate hydration  The patient was discussed with Dr Eleni Mccracken and he is in agreement with the above note    "

## 2023-04-21 NOTE — NURSING NOTE
Pt visible on unit  Able to communicate needs  Affect flat  Mood depressed  Expresses feeling sad st times  Medical provider aware of pt symptom complaints of lightheaded no new orders at this time  Will continue to monitor  Denies active SI, HI, or hallucinations  Safety precautions maintained  Will continue to monitor and assess

## 2023-04-21 NOTE — PLAN OF CARE
Problem: Alteration in Thoughts and Perception  Goal: Verbalize thoughts and feelings  Description: Interventions:  - Promote a nonjudgmental and trusting relationship with the patient through active listening and therapeutic communication  - Assess patient's level of functioning, behavior and potential for risk  - Engage patient in 1 on 1 interactions  - Encourage patient to express fears, feelings, frustrations, and discuss symptoms    - [place] patient to reality, help patient recognize reality-based thinking   - Administer medications as ordered and assess for potential side effects  - Provide the patient education related to the signs and symptoms of the illness and desired effects of prescribed medications  Outcome: Progressing

## 2023-04-21 NOTE — NURSING NOTE
"Patient cooperative & pleasant this evening  Patient noted to be lying in bed during assessment  Patient states \"i've been doing okay, I guess I didn't realize I wasn't ok last time until I got home\"  Went to the dayroom for snack  No c/o dizziness this evening  Medication compliant  Q 7 minute checks maintained     "

## 2023-04-22 RX ADMIN — Medication 6 MG: at 20:44

## 2023-04-22 RX ADMIN — PALIPERIDONE 3 MG: 3 TABLET, EXTENDED RELEASE ORAL at 09:23

## 2023-04-22 RX ADMIN — CYANOCOBALAMIN TAB 500 MCG 500 MCG: 500 TAB at 09:23

## 2023-04-22 RX ADMIN — SERTRALINE HYDROCHLORIDE 50 MG: 50 TABLET ORAL at 09:23

## 2023-04-22 NOTE — PROGRESS NOTES
"Progress Note - Ashok Schmid 25 y o  female MRN: [id number]    Unit/Bed#: Rossana Kaur 558-77 Encounter: [id number]        Subjective:   Patient seen and examined at bedside after reviewing the chart and discussing the case with the caring staff  Patient examined at bedside  Patient reports no lightheadedness today  She states \"I feel good  \"    Physical Exam   Vitals: Blood pressure 92/55, pulse 75, temperature 98 2 °F (36 8 °C), temperature source Temporal, resp  rate 18, height 5' 6\" (1 676 m), weight 63 4 kg (139 lb 12 8 oz), last menstrual period 04/15/2023, SpO2 98 %  ,Body mass index is 22 56 kg/m²  Constitutional: Patient in no acute distress  HEENT: PERR, EOMI, MMM  Cardiovascular: Normal rate and regular rhythm  Pulmonary/Chest: Effort normal and breath sounds normal    Abdomen: Soft, + BS, NT  Assessment/Plan:  Ashok Schmid is a(n) 25y o  year old female with schizoaffective disorder depressive type  1  Allergic rhinitis   Stable  2  Insomnia   Patient is on melatonin  3  Arthralgia/headaches   Tylenol as needed  4  Vitamin D deficiency  Patient is on vitamin D bolus doses for 10 weeks followed by vitamin D3 1000 units daily  5  Vitamin B12 deficiency  Patient is on vitamin B12 supplements  6  Hypotension  Lightheadedness seems to have resolved  Continue to monitor  The patient was discussed with Dr Zuri Solares and he is in agreement with the above note    "

## 2023-04-22 NOTE — PROGRESS NOTES
Progress Note - Denisezanatracy William Hicks 25 y o  female MRN: [id number]   Unit/Bed#: Td Lyn 213-49 Encounter: [id number]    Behavior over the last 24 hours: improved  [place] presents anxious and apprehensive  She is no longer complaining of dizziness or daytime tiredness  She feels medications have been helpful and able to tolerate current doses  She reports improvement in both depression and anxiety and denies any suicidal thoughts  She is interested in outpatient therapy and expressed interest in the partial program upon discharge  Limited participation in milieu  Sleep: slept off and on  Appetite: fair  Medication side effects: No   ROS: all other systems are negative    Mental Status Evaluation:    Appearance:  age appropriate   Behavior:  cooperative, guarded   Speech:  slow, soft   Mood:  depressed, anxious   Affect:  appropriate   Thought Process:  goal directed   Associations: intact associations   Thought Content:  no overt delusions   Perceptual Disturbances: none   Risk Potential: Suicidal ideation - None  Homicidal ideation - None  Potential for aggression - No   Sensorium:  oriented to person, place and time/date   Memory:  recent and remote memory grossly intact   Consciousness:  alert and awake   Attention: decreased concentration and decreased attention span   Insight:  limited   Judgment: limited   Gait/Station: normal gait/station, normal balance   Motor Activity: no abnormal movements     Vital signs in last 24 hours:    Temp:  [97 5 °F (36 4 °C)-98 2 °F (36 8 °C)] 98 2 °F (36 8 °C)  HR:  [67-75] 75  Resp:  [18] 18  BP: (87-92)/(54-55) 92/55    Laboratory results: I have personally reviewed all pertinent laboratory/tests results  Progress Toward Goals: progressing    Assessment/Plan   Principal Problem:    Mood [facility] with psychosis ([facility]    Recommended Treatment:     Planned medication and treatment changes:     All current active medications have been reviewed  Encourage group therapy, milieu therapy and occupational therapy  Behavioral Health checks every 7 minutes    Requires continued inpatient treatment due to chronic illness and high risk of decompensation if discharged before long term stability is achieved      Continue current medications:  Current Facility-Administered Medications   Medication Dose Route Frequency Provider Last Rate   • acetaminophen  650 mg Oral Q6H PRN Angelique Perone Medei, CRNP     • acetaminophen  650 mg Oral Q4H PRN Angelique Perone Medei, CRNP     • acetaminophen  975 mg Oral Q6H PRN Angelique Perone Medei, CRNP     • aluminum-magnesium hydroxide-simethicone  30 mL Oral Q4H PRN Angelique Perone Medei, CRNP     • benztropine  1 mg Oral Q6H PRN Angelique Perone [facility]     • [START ON 5/15/2023] cholecalciferol  1,000 Units Oral Daily Jovan Solitario MD     • cyanocobalamin  500 mcg Oral Daily Jovan Solitario MD     • hydrOXYzine HCL  50 mg Oral Q6H PRN Max 4/day Angelique Perone Medei, CRNP      Or   • diphenhydrAMINE  50 mg Intramuscular Q6H PRN Angelique Perone Medei, CRNP     • ergocalciferol  50,000 Units Oral Weekly Jovan Solitario MD     • hydrOXYzine HCL  100 mg Oral Q6H PRN Max 4/day Angelique Perone Medei, CRNP      Or   • LORazepam  2 mg Intramuscular Q6H PRN Angelique Perone Medei, CRNP     • hydrOXYzine HCL  25 mg Oral Q6H PRN Max 4/day Angelique Perone Medei, CRNP     • melatonin  6 mg Oral HS Angelique Perone Medei, CRNP     • OLANZapine  10 mg Oral Q3H PRN Max 3/day Angelique Perone Medei, CRNP      Or   • OLANZapine  10 mg Intramuscular Q3H PRN Max 3/day Angelique Perone Medei, CRNP     • OLANZapine  5 mg Oral Q3H PRN Max 6/day Angelique Perone Medei, CRNP      Or   • OLANZapine  5 mg Intramuscular Q3H PRN Max 6/day Karen M Medei, CRNP     • OLANZapine  2 5 mg Oral Q3H PRN Max 8/day Angelique Perone Medei, CRNP     • paliperidone  3 mg Oral Daily Lavinia Rosa MD     • polyethylene glycol  17 g Oral Daily PRN Angelique Perone Medei, CRNP     • sertraline  50 mg Oral Daily ÁNGELA Garcia     • traZODone  100 mg Oral HS ÁNGELA Kay         Risks / Benefits of Treatment:    Risks, benefits, and possible side effects of medications explained to patient and patient verbalizes understanding and agreement for treatment  Counseling / Coordination of Care:    Patient's progress discussed with staff in treatment team meeting  Medications, treatment progress and treatment plan reviewed with patient      ÁNGELA Jerome 04/22/23

## 2023-04-22 NOTE — NURSING NOTE
Patient visible on unit  Patient calm and cooperative, soft spoken, flat affect  Denies SI,HI,AVH  Appears depressed  No c/o lightheadedness  Safety checks continue Q 7 minutes

## 2023-04-23 RX ADMIN — PALIPERIDONE 3 MG: 3 TABLET, EXTENDED RELEASE ORAL at 09:31

## 2023-04-23 RX ADMIN — Medication 6 MG: at 20:44

## 2023-04-23 RX ADMIN — SERTRALINE HYDROCHLORIDE 50 MG: 50 TABLET ORAL at 09:31

## 2023-04-23 RX ADMIN — CYANOCOBALAMIN TAB 500 MCG 500 MCG: 500 TAB at 09:31

## 2023-04-23 NOTE — PLAN OF CARE
Problem: Alteration in Thoughts and Perception  Goal: Verbalize thoughts and feelings  Description: Interventions:  - Promote a nonjudgmental and trusting relationship with the patient through active listening and therapeutic communication  - Assess patient's level of functioning, behavior and potential for risk  - Engage patient in 1 on 1 interactions  - Encourage patient to express fears, feelings, frustrations, and discuss symptoms    - [place] patient to reality, help patient recognize reality-based thinking   - Administer medications as ordered and assess for potential side effects  - Provide the patient education related to the signs and symptoms of the illness and desired effects of prescribed medications  Outcome: Progressing

## 2023-04-23 NOTE — NURSING NOTE
"Pt visible on unit  Able to communicate needs  Affect flat  Mood depressed  Verbalizes feeling \"less tired\" No complaints of lightheadedness  Less focused on blood pressured  Denies active SI, HI, or hallucinations  Safety precautions maintained  Will continue to monitor and assess     "

## 2023-04-23 NOTE — PROGRESS NOTES
"Progress Note - Jem Michael 25 y o  female MRN: [id number]    Unit/Bed#: [address] Encounter: [id number]        Subjective:   Patient seen and examined at bedside after reviewing the chart and discussing the case with the caring staff  Patient examined at bedside  Patient has no acute complaints  She continues to deny any lightheadedness/dizziness  Physical Exam   Vitals: Blood pressure 101/57, pulse 68, temperature 97 6 °F (36 4 °C), temperature source Tympanic, resp  rate 18, height 5' 6\" (1 676 m), weight 63 4 kg (139 lb 12 8 oz), last menstrual period 04/15/2023, SpO2 98 %  ,Body mass index is 22 56 kg/m²  Constitutional: Patient in no acute distress  HEENT: PERR, EOMI, MMM  Cardiovascular: Normal rate and regular rhythm  Pulmonary/Chest: Effort normal and breath sounds normal    Abdomen: Soft, + BS, NT  Assessment/Plan:  Jem Michael is a(n) 25y o  year old female with schizoaffective disorder depressive type  1  Allergic rhinitis   Stable  2  Insomnia   Patient is on melatonin  3  Arthralgia/headaches   Tylenol as needed  4  Vitamin D deficiency  Patient is on vitamin D bolus doses for 10 weeks followed by vitamin D3 1000 units daily  5  Vitamin B12 deficiency  Patient is on vitamin B12 supplements  6  Hypotension  Lightheadedness seems to have resolved  Continue to monitor  The patient was discussed with Dr Zion Ozuna and he is in agreement with the above note    "

## 2023-04-23 NOTE — PROGRESS NOTES
Progress Note - Robbiemary annefrederick Hicks 25 y o  female MRN: [id number]   Unit/Bed#: Jenelle Tomlin 438-26 Encounter: [id number]    Behavior over the last 24 hours: improving  Sarmad continues to be out of her room more, interacting with peers, showing gradual improvements  She does continue to present flat and preoccupied, but in conversation reports reduction in both depression and anxiety  No longer having complaints of dizziness or any other somatic complaints and tolerating medications without difficulty  Limited participation in milieu  Sleep: slept off and on  Appetite: fair  Medication side effects: No   ROS: all other systems are negative    Mental Status Evaluation:    Appearance:  age appropriate   Behavior:  pleasant, cooperative   Speech:  slow, soft   Mood:  depressed   Affect:  flat   Thought Process:  goal directed   Associations: intact associations   Thought Content:  no overt delusions   Perceptual Disturbances: none   Risk Potential: Suicidal ideation - None  Homicidal ideation - None  Potential for aggression - No   Sensorium:  oriented to person, place and time/date   Memory:  recent and remote memory grossly intact   Consciousness:  alert and awake   Attention: decreased concentration and decreased attention span   Insight:  limited   Judgment: limited   Gait/Station: normal gait/station, normal balance   Motor Activity: no abnormal movements     Vital signs in last 24 hours:    Temp:  [97 6 °F (36 4 °C)-97 8 °F (36 6 °C)] 97 6 °F (36 4 °C)  HR:  [65-68] 68  Resp:  [18] 18  BP: ()/(52-57) 101/57    Laboratory results: I have personally reviewed all pertinent laboratory/tests results  Progress Toward Goals: continues to improve    Assessment/Plan   Principal Problem:    Mood disorder with psychosis ([facility]    Recommended Treatment:     Planned medication and treatment changes:     All current active medications have been reviewed  Encourage group therapy, milieu therapy and occupational therapy  Behavioral Health checks every 7 minutes    Requires continued inpatient treatment due to chronic illness and high risk of decompensation if discharged before long term stability is achieved      Continue current medications:  Current Facility-Administered Medications   Medication Dose Route Frequency Provider Last Rate   • acetaminophen  650 mg Oral Q6H PRN ÁNGELA Khalil     • acetaminophen  650 mg Oral Q4H PRN ÁNGELA Khalil     • acetaminophen  975 mg Oral Q6H PRN ÁNGELA Khalil     • aluminum-magnesium hydroxide-simethicone  30 mL Oral Q4H PRN ÁNGELA Khalil     • benztropine  1 mg Oral Q6H PRN ÁNGELA Freed DR     • [START ON 5/15/2023] cholecalciferol  1,000 Units Oral Daily Jacey Crowley MD     • cyanocobalamin  500 mcg Oral Daily Jacey Crowley MD     • hydrOXYzine HCL  50 mg Oral Q6H PRN Max 4/day ÁNGELA Khalil      Or   • diphenhydrAMINE  50 mg Intramuscular Q6H PRN ÁNGELA Khalil     • ergocalciferol  50,000 Units Oral Weekly Jacey Crowley MD     • hydrOXYzine HCL  100 mg Oral Q6H PRN Max 4/day ÁNGELA Khalil      Or   • LORazepam  2 mg Intramuscular Q6H PRN ÁNGELA Khalil     • hydrOXYzine HCL  25 mg Oral Q6H PRN Max 4/day ÁNGELA Khalil     • melatonin  6 mg Oral HS ÁNGELA Khalil     • OLANZapine  10 mg Oral Q3H PRN Max 3/day ÁNGELA Khalil      Or   • OLANZapine  10 mg Intramuscular Q3H PRN Max 3/day ÁNGELA Khalil     • OLANZapine  5 mg Oral Q3H PRN Max 6/day ÁNGELA Khalil      Or   • OLANZapine  5 mg Intramuscular Q3H PRN Max 6/day ÁNGELA Russell     • OLANZapine  2 5 mg Oral Q3H PRN Max 8/day ÁNGELA Khalil     • paliperidone  3 mg Oral Daily Sanket Peacock MD     • polyethylene glycol  17 g Oral Daily PRN ÁNGELA Khalil     • sertraline  50 mg Oral Daily ÁNGELA Khalil     • traZODone  100 mg Oral HS PRN ÁNGELA Rodriguez         Risks / Benefits of Treatment:    Risks, benefits, and possible side effects of medications explained to patient and patient verbalizes understanding and agreement for treatment  Counseling / Coordination of Care:    Patient's progress discussed with staff in treatment team meeting  Medications, treatment progress and treatment plan reviewed with patient      ÁNGELA Puente 04/23/23

## 2023-04-23 NOTE — NURSING NOTE
Patient spent time with peers in the dining room  Quiet, reserved, calm, cooperative, and no disturbed behavior noted  Patient denies SI/HI/AVH  Compliant with medications  Denies anxiety and depression  Stated she feels safe here  Q 7 minutes safety checks maintained and continued

## 2023-04-24 RX ORDER — LAMOTRIGINE 25 MG/1
25 TABLET ORAL DAILY
Status: DISCONTINUED | OUTPATIENT
Start: 2023-04-24 | End: 2023-04-25

## 2023-04-24 RX ADMIN — Medication 6 MG: at 21:33

## 2023-04-24 RX ADMIN — PALIPERIDONE 3 MG: 3 TABLET, EXTENDED RELEASE ORAL at 08:32

## 2023-04-24 RX ADMIN — CYANOCOBALAMIN TAB 500 MCG 500 MCG: 500 TAB at 08:33

## 2023-04-24 RX ADMIN — SERTRALINE HYDROCHLORIDE 50 MG: 50 TABLET ORAL at 08:33

## 2023-04-24 NOTE — NURSING NOTE
Patient visible and social with select peers  Quiet, flat, cam, cooperative, and no disturbed behavior noted  Denies SI/HI/AVH  No complaints of lightheadedness or dizziness  Compliant with medications  Q 7 minutes safety checks maintained and continued

## 2023-04-24 NOTE — DISCHARGE INSTR - OTHER ORDERS
You are being discharged to [facility], [address] [address]  Phone: [phone]  Triggers you have identified during your hospitalization that led to your admission of a distressed mood includes ineffective coping skills and adverse reaction to medication  Coping skills you have identified during your hospitalization include exercise and having alone time  If you are unable to deal with your distressed mood alone please contact Sofi Lujan (mother) [phone] or Raul Espinoza (Sister) [phone]  If that is not effective and you continue to have a distressed mood, are overwhelmed, or in crisis, please contact (Crisis #) [facility] 67 [phone] T8702270, dial 911 or go to the nearest emergency center  [facility] Crisis Hotline: Jeremy Tse Suicide Prevention Lifeline: 4-266.265.8839  *Alcohol Anonymous: 345.798.7741  *[place] Drug & Alcohol Commission: (608) 841-6008  [address] [facility]  on [address] [place] [address] ([facility]) HELPLINE: 592.506.2012/Website: www dariel org  *Substance Abuse and [address] [facility]) [employer], which is a confidential,  free, 24-hour-a-day, 365-day-a-year, information service for individuals and family members facing mental health  and/or substance use disorders  This service provides referrals to local treatment facilities, support groups, and  community-based organizations  Callers can also order free publications and other information  Call  8-137.566.3375/Website: www [facility] gov  *[facility] 2-1-1: This is a toll free, confidential, 24-hour-a-day service which connects you to a  community  in your area who can help you find services and resources that are available  to you locally and provide critical services that can improve and save lives  Call: 211 /Website: http://  www 211 org/    You declined a follow up primary care provider appointment  at this time       Luis E Adams or Brittany our [facility] Nurse Navigators, will be calling you after your discharge, on the phone number that you provided  They will be available as an additional support, if needed

## 2023-04-24 NOTE — NURSING NOTE
"Patient visible and cooperative on unit but mostly isolative to self  Patient did open up in conversation with this writer  Patient reports she has been depressed for a long time, which is why she decided to get psychiatric help the first time  Reports they put her on prozac out patient and that is when all of her psychotic symptoms started  Patient reports she does not want to add different medications at this point  Reports she was apprehensive but agreed to the invega  Reports over the weekend she was feeling good and motivated again  Reports then today the team wanted to start her on Lamictal and she is not happy with this  Patient was tearful during conversation and states \"Im just frustrated because I feel like no one is listening to me  \"  Patient reports maybe she would be in agreement to Lamictal, but on an out patient basis because right now she wants to see what happens with the invega  Patient states Flor Men been depressed for years, and its not going to be fixed in a week and everyone just wants to jam pills down my throat  \"  Reassurance and support provided to patient  Patient decided not to take the Lamictal today and hopeful to speak with provider tomorrow regarding medications  Pt  Reports she has a hard time expressing herself and encouraged to write down points she would like to make with provider  Patient denies any delusional thoughts  Denies SI/HI/AVH   Compliant with scheduled medications apart from Lamictal   "

## 2023-04-24 NOTE — DISCHARGE INSTR - APPOINTMENTS
"[address]  [facility]  [address]  [phone] [address] [facility]  [facility]olas[facility]hob[facility] Services[facility]orgiana[facility]  [address] [facility], [address]  [facility], located at the [address], offers a  welcoming and comfortable, non-residential environment for those dealing with a variety of mental health issues  Those seeking services or support for a non-life-threatening mental health circumstance will be greeted by a   and will be assessed by a professional crisis intervention specialist in a relaxed, non-clinical  environment  Individuals will be evaluated and provided with the resources and/or referrals needed to deal with  the immediate situation  This may include psychotherapy sessions or connections other community resources -  such as [facility] -- and specialists  A  may be assigned to provide ongoing support  As this is considered the least restrictive environment for mental health services, the walk-in center is not the ideal  option for anyone who is experiencing an extreme mental health crisis  Individuals who are experiencing that level  of distress are encouraged to seek immediate medical attention at a hospital emergency room  [place] Suzanne said, \"You may not control all the events that happen to you, but you can decide not to be reduced by them  \"   We are currently living in quite unpredictable times, where we may be feeling completely out of control  At [facility], we understand the distress which results in feeling out of control, which is why we remained open during the pandemic  [facility] continues to provide partial hospitalization services in a telehealth setting amidst COVID-19 to ensure the health and emotional safety of our clients     We have adapted our ability to provide care to a variety of clients by offering " our partial program services via the [employer] care is being provided at  [employer]  Time  Group    9:00 to 9:30  Morning Assessment    9:30 to 10:30 Psychotherapy Group     Break    10:30-11:30 Education Group    11:45-12:30 Lunch    12:30-1:30 Allied Therapy    1:30-2:00 Wrap Up and Goal Setting    As scheduled Case Management Sessions with CM        Eligible clients need access to internet and hardware to participate in the program  They need the independent and technological ability to access the groups and participate in a virtual setting  They need to be able to set aside the interrupted time to participate in the program from 9:00am - 2pm      Clients are offered three groups each day and they are offered up to three individual Case Management sessions via phone or Teams to work on skill building, aftercare planning, and connection to services  Clients are encouraged to inform their outpatient team that they will be attending [facility] and their  can coordinate a return to treatment with them upon discharge

## 2023-04-24 NOTE — PROGRESS NOTES
04/24/23 0730   Activity/Group Checklist   Group   (Morning Meeting and Coffee)   Attendance Attended   Attendance Duration (min) 46-60   Interactions Interacted appropriately   Affect/Mood Appropriate; Constricted;Calm   Goals Achieved Able to listen to others; Able to engage in interactions

## 2023-04-24 NOTE — PROGRESS NOTES
Progress Note - Denisefrederick Hicks 25 y o  female MRN: [id number]   Unit/Bed#: [facility] 514-95 Encounter: [id number]    Behavior over the last 24 hours: minimal improvement  [place] seen today, per staff report has been withdrawn on the unit  Patient seen today for follow-up  Subjectively denies any complaints, reports ongoing  anxiety but states she feels better  Has been somewhat somatically preoccupied regarding side effects from medications  Objectively she appears withdrawn and dysphoric  Discussed medication changes as she has complained of side effects to Zoloft with possibility of low blood pressures, we have discussed ongoing taper and discontinuation of Zoloft with addition of Lamictal   Discussion regarding risks benefits and side effect of Lamictal explained to patient including risk of rash  With this patient got highly emotional, stated feeling worried that this was going to postpone her discharge and appeared more anxious    Supportive counseling provided        Mental Status Evaluation:    Appearance:  casually dressed, adequate grooming, looks stated age, thin & gaunt looking   Behavior:  calm, psychomotor retardation   Speech:  decreased rate, slow   Mood:  dysphoric, anxious   Affect:  blunted   Thought Process:  linear   Associations: intact associations   Thought Content:  no overt delusions   Perceptual Disturbances: no auditory hallucinations, no visual hallucinations   Risk Potential: Suicidal ideation - None at present  Homicidal ideation - None at present   Sensorium:  oriented to person, place and time/date   Memory:  recent and remote memory grossly intact   Consciousness:  alert and awake   Attention: attention span and concentration are age appropriate   Insight:  impaired   Judgment: impaired   Gait/Station: normal gait/station   Motor Activity: no abnormal movements     Vital signs in last 24 hours:    Temp:  [97 6 °F (36 4 °C)-98 3 °F (36 8 °C)] 98 3 °F (36 8 °C)  HR:  [63-66] 63  Resp:  [18] 18  BP: ()/(53-68) 104/68    Laboratory results: I have personally reviewed all pertinent laboratory/tests results  Assessment/Plan   Principal Problem:    Mood disorder with psychosis ([facility] [address] )    Recommended Treatment:     Planned medication and treatment changes: We will add Lamictal 25 mg daily to patient's current medication regimen  For now we will continue Zoloft 50 mg daily and continue Invega 3 mg daily      All current active medications have been reviewed  Encourage group therapy, milieu therapy and occupational therapy  Behavioral Health checks every 7 minutes  Current Facility-Administered Medications   Medication Dose Route Frequency Provider Last Rate   • acetaminophen  650 mg Oral Q6H PRN Seble Reagin Medei, CRNP     • acetaminophen  650 mg Oral Q4H PRN Seble Reagin Medei, CRNP     • acetaminophen  975 mg Oral Q6H PRN Seble Reagin Medei, CRNP     • aluminum-magnesium hydroxide-simethicone  30 mL Oral Q4H PRN Seble Reagin Medei, CRNP     • benztropine  1 mg Oral Q6H PRN Seble Reagin Medei, CRNP     • [START ON 5/15/2023] cholecalciferol  1,000 Units Oral Daily Doroteo Torrez MD     • cyanocobalamin  500 mcg Oral Daily Doroteo Torrez MD     • hydrOXYzine HCL  50 mg Oral Q6H PRN Max 4/day Seble Reagin Medei, CRNP      Or   • diphenhydrAMINE  50 mg Intramuscular Q6H PRN Seble Reagin Medei, CRNP     • ergocalciferol  50,000 Units Oral Weekly Doroteo Torrez MD     • hydrOXYzine HCL  100 mg Oral Q6H PRN Max 4/day Seble Reagin Medei, CRNP      Or   • LORazepam  2 mg Intramuscular Q6H PRN Seble Reagin Medei, CRNP     • hydrOXYzine HCL  25 mg Oral Q6H PRN Max 4/day Seble Reagin Medei, CRNP     • melatonin  6 mg Oral HS Seble Reagin Medei, CRNP     • OLANZapine  10 mg Oral Q3H PRN Max 3/day Seble Reagin Medei, CRNP      Or   • OLANZapine  10 mg Intramuscular Q3H PRN Max 3/day Seble Reagin Medei, CRNP     • OLANZapine  5 mg Oral Q3H PRN Max 6/day Karen M Medei, CRNP      Or   • OLANZapine  5 mg Intramuscular Q3H PRN Max 6/day ÁNGELA Taylor     • OLANZapine  2 5 mg Oral Q3H PRN Max 8/day ÁNGELA Taylor     • paliperidone  3 mg Oral Daily Daksha Dubois MD     • polyethylene glycol  17 g Oral Daily PRN ÁNGELA Cowan DR     • sertraline  50 mg Oral Daily ÁNGELA Taylor     • traZODone  100 mg Oral HS PRN ÁNGELA Taylor         Risks / Benefits of Treatment:    Risks, benefits, and possible side effects of medications explained to patient and patient verbalizes understanding and agreement for treatment  Counseling / Coordination of Care: Total floor / unit time spent today 35 minutes  Greater than 50% of total time was spent with the patient and / or family counseling and / or coordination of care  A description of counseling / coordination of care:  Patient's progress discussed with staff in treatment team meeting  Medications, treatment progress and treatment plan reviewed with patient      Segun Wallace MD 04/24/23

## 2023-04-24 NOTE — SOCIAL WORK
Sw met with pt for check in  Pt denies current SI/HI/AVH/dep/anx, reports feeling improved with improved mood, but bored and dc focused  Pt is agreeable to php program with further sw education

## 2023-04-24 NOTE — PROGRESS NOTES
"Progress Note - Kayleigh Granda 25 y o  female MRN: [id number]    Unit/Bed#: [facility] 751-10 Encounter: [id number]        Subjective:   Patient seen and examined at bedside after reviewing the chart and discussing the case with the caring staff  Patient examined at bedside  Patient has no acute complaints  Physical Exam   Vitals: Blood pressure 104/68, pulse 63, temperature 98 3 °F (36 8 °C), temperature source Temporal, resp  rate 18, height 5' 6\" (1 676 m), weight 63 4 kg (139 lb 12 8 oz), last menstrual period 04/15/2023, SpO2 99 %  ,Body mass index is 22 56 kg/m²  Constitutional: Patient in no acute distress  HEENT: PERR, EOMI, MMM  Cardiovascular: Normal rate and regular rhythm  Pulmonary/Chest: Effort normal and breath sounds normal    Abdomen: Soft, + BS, NT  Assessment/Plan:  Kayleigh Granda is a(n) 25y o  year old female with schizoaffective disorder depressive type  1  Allergic rhinitis   Stable  2  Insomnia   Patient is on melatonin  3  Arthralgia/headaches   Tylenol as needed  4  Vitamin D deficiency  Patient is on vitamin D bolus doses for 10 weeks followed by vitamin D3 1000 units daily  5  Vitamin B12 deficiency  Patient is on vitamin B12 supplements  6  Hypotension  Lightheadedness seems to have resolved  Continue to monitor    "

## 2023-04-24 NOTE — SOCIAL WORK
"Pt requested to meet with sw to discuss progress, tx  Pt is declining med adjustments stating \"I feel stable, I want to go home, this is not the place to get better  I have been depressed and anxious for so long, I don't remember how I felt at my best, but I know I won't get there here  I want to do the partial program and stay on these medications\"  Support, reassurance provided as pt presents tearful, irritable at times  Fernanda received vm from Marilu Yepez (mother) [phone], sw returned call  Sw provided update on medication adjustments, pt's mood lability and swings     "

## 2023-04-24 NOTE — PROGRESS NOTES
04/24/23 1030   Activity/Group Checklist   Group   (Creative Thinking Self Reflection)   Attendance Did not attend  (AT group offered, PT elected to remain in room)

## 2023-04-24 NOTE — SOCIAL WORK
Fernanda contacted Lana Lazcano (mother) [phone] to provide family update on pt's progress, tx  [place] is concerned about pt's low BP, requests nurse contact to discuss BP, physical symptoms which may be related to medication  [place] states she spoke to pt about former pt texting her, requests mother block this person's number  Sw provided education on SSDI/Medicaid process, recommendations for [facility] office  C[place] confirms pt's baseline: introvert, quiet, does not brighten upon contact, limited eye contact, does not smile too often  Call ended mutually  Pt's RN contacted for family call

## 2023-04-24 NOTE — PROGRESS NOTES
04/24/23   Team Meeting   Meeting Type Daily Rounds   Team Members Present   Team Members Present Physician;Nurse;   Physician Team Member Dr David Truong MD; HOSP DR ESTELLE LOPEZ, [address] [place] [address]   Nursing Team Member Miguel Boo RN; [facility]   Social Work Team Member MONI Appiah; [place], [place]   Patient/Family Present   Patient Present No   Patient's Family Present No     Minimal, guarded, flat affect, denies all psych symptoms, tearful, upset over employment, somatic, meds adjusted

## 2023-04-24 NOTE — PLAN OF CARE
Problem: DISCHARGE PLANNING - CARE MANAGEMENT  Goal: Discharge to post-acute care or home with appropriate resources  Description: INTERVENTIONS:  - Conduct assessment to determine patient/family and health care team treatment goals, and need for post-acute services based on payer coverage, community resources, and patient preferences, and barriers to discharge  - Address psychosocial, clinical, and financial barriers to discharge as identified in assessment in conjunction with the patient/family and health care team  - Arrange appropriate level of post-acute services according to patient’s   needs and preference and payer coverage in collaboration with the physician and health care team  - Communicate with and update the patient/family, physician, and health care team regarding progress on the discharge plan  - Arrange appropriate transportation to post-acute venues  Outcome: Progressing     Pt progressing, no dc date established, will dc home with [facility] med mgmt, PHP referral

## 2023-04-25 RX ORDER — PALIPERIDONE 3 MG/1
3 TABLET, EXTENDED RELEASE ORAL DAILY
Qty: 30 TABLET | Refills: 0 | Status: SHIPPED | OUTPATIENT
Start: 2023-04-26 | End: 2023-05-26

## 2023-04-25 RX ORDER — LANOLIN ALCOHOL/MO/W.PET/CERES
6 CREAM (GRAM) TOPICAL
Qty: 60 TABLET | Refills: 0 | Status: SHIPPED | OUTPATIENT
Start: 2023-04-25 | End: 2023-05-24

## 2023-04-25 RX ADMIN — Medication 6 MG: at 21:10

## 2023-04-25 RX ADMIN — CYANOCOBALAMIN TAB 500 MCG 500 MCG: 500 TAB at 08:41

## 2023-04-25 RX ADMIN — PALIPERIDONE 3 MG: 3 TABLET, EXTENDED RELEASE ORAL at 08:41

## 2023-04-25 RX ADMIN — SERTRALINE HYDROCHLORIDE 50 MG: 50 TABLET ORAL at 08:41

## 2023-04-25 RX ADMIN — ERGOCALCIFEROL 50000 UNITS: 1.25 CAPSULE ORAL at 08:41

## 2023-04-25 NOTE — SOCIAL WORK
Olaf notified Sofi Lujan (mother) [phone] regarding pt's dc tomorrow, will pick pt up tomorrow at 12pm, olaf reviewed dc plan with php referral, [facility] follow up appt  Bob Kowalski does not have any questions after speaking with provider today  Call ended mutually  Olaf met with pt to review dc planning, agreeable to dc tomorrow 12pm with PHP, [facility] follow up, declined pcp appt  Pt states she feels ready for dc, improving with mood, less dep/anx  Olaf reviewed safety plan of utilizing ED, WIC, PHP providers if symptoms worsen

## 2023-04-25 NOTE — SOCIAL WORK
Fernanda contacted Gi, spoke to Mey Cohen [phone], med mgmt appt scheduled: 5/30 1030am with Theresa Hernandez

## 2023-04-25 NOTE — NURSING NOTE
Patient was visible in milieu and retired to bed early after snack  Patient appears sad and depressed  Patient stated she is frustrated over the doctor wanting to add more medications to her regiment  Planned to talk to doctor tomorrow with her nurse present  Poor insight about mental health diagnosis  Medication compliant  Denies any SI/HI, AV/H, anxiety or depression

## 2023-04-25 NOTE — PROGRESS NOTES
04/25/23 1300   Activity/Group Checklist   Group   (Open Forum: Self Care)   Attendance Attended   Attendance Duration (min) 31-45   Interactions Interacted appropriately   Affect/Mood Appropriate;Calm   Goals Achieved Identified feelings; Discussed coping strategies

## 2023-04-25 NOTE — PROGRESS NOTES
04/25/23 1030   Activity/Group Checklist   Group   (Self Care Assessment and Art Therapy Processing)   Attendance Attended   Attendance Duration (min) Greater than 60   Interactions Interacted appropriately   Affect/Mood Appropriate;Calm   Goals Achieved Identified feelings; Identified triggers; Identified relapse prevention strategies; Discussed discharge plans; Discussed coping strategies; Increased hopefulness; Identified resources and support systems; Able to listen to others; Able to engage in interactions; Able to reflect/comment on own behavior;Able to manage/cope with feelings; Able to recieve feedback; Able to give feedback to another

## 2023-04-25 NOTE — PROGRESS NOTES
04/25/23 0730   Activity/Group Checklist   Group   (Morning Meeting and Coffee)   Attendance Attended   Attendance Duration (min) 46-60   Interactions Interacted appropriately   Affect/Mood Appropriate   Goals Achieved Identified feelings; Discussed coping strategies; Able to listen to others; Increased hopefulness; Able to engage in interactions; Able to reflect/comment on own behavior;Able to manage/cope with feelings;Verbalized increased hopefulness; Able to recieve feedback; Able to give feedback to another

## 2023-04-25 NOTE — SOCIAL WORK
[facility]    Name and Date of Birth:  Horace Berry 25 y o  1995    Date of Referral: April 25, 2023    Presenting Symptoms and Stressors:      Symptoms:  depressive symptoms  Stressors:  ineffective coping skills, new [facility] diagnosis    Access to Weapons:  No    Smoking Status: none    Substance Use:  None    Suicidal Ideation: None at present    Homicidal Ideation: None    Depressed Mood: depressed mood, sadness, hopelessness, helplessness, low motivation, negative thoughts    Crystal/Hypomania: None    Psychosis: None    Agitation: No    Appetite Changes: normal appetite    Sleep Disturbance: normal sleep    Diagnoses:  1  mood disorder with psychosis    Current Psychiatrist or Therapist:    Psychiatrist: Gi keating, [address]  Therapist: None    Do they Require Ambulatory Assistance: No    Communication Assistance: not required     Legal Issues: None        Dianna Mas

## 2023-04-25 NOTE — PROGRESS NOTES
04/25/23   Team Meeting   Meeting Type Daily Rounds   Team Members Present   Team Members Present Physician;Nurse;   Physician Team Member Dr Radha Arambula MD; HOSP DR ESTEL[facility] LOPE[facility], [address]  Team Member Sandy Santos RN; [facility]   Social Work Team Member Asia Barron   Patient/Family Present   Patient Present No   Patient's Family Present No     Not taking new meds, tearful, feels good with Invega, frustrated, dc this week

## 2023-04-25 NOTE — PROGRESS NOTES
Progress Note - Kerry William TEETEE Kurt 25 y o  female MRN: [id number]   Unit/Bed#: [facility] 957-18 Encounter: [id number]    Behavior over the last 24 hours: some improvement  Diana Atwood seen today, per staff report has been withdrawn but visible on the unit  Patient seen for follow-up today  Continues to subjectively report having felt better since being on Invega and Zoloft, denies any thoughts of self-harm and reports clarity in her thinking process  Objectively patient continues to have a blunted affect  Patient was initiated on Lamictal yesterday after having a discussion with her about it  Ultimately patient refused medication Lamictal feeling that she did not want to initiate a new psychotropic, has been feeling improved with her current regimen  Patient educated on rationale for use of Lamictal for enhancement of benefits as it appears we are limited in terms of increasing Zoloft as she has side effects  Patient continues to feel she would prefer her current medication regimen  Patient's mother contacted by phone with patient in the room  Details of patient's progress, treatment and disposition discussed with mother in presence of patient  All questions answered  Patient reports good sleep and appetite  Tolerating Zoloft and Invega        Mental Status Evaluation:    Appearance:  casually dressed, adequate grooming, looks stated age   Behavior:  calm, guarded   Speech:  slow, soft   Mood:  anxious   Affect:  blunted   Thought Process:  linear   Associations: intact associations   Thought Content:  no overt delusions   Perceptual Disturbances: no auditory hallucinations, no visual hallucinations   Risk Potential: Suicidal ideation - None  Homicidal ideation - None   Sensorium:  oriented to person, place and time/date   Memory:  recent and remote memory grossly intact   Consciousness:  alert and awake   Attention: attention span and concentration are age appropriate   Insight:  limited Judgment: limited   Gait/Station: normal gait/station   Motor Activity: no abnormal movements     Vital signs in last 24 hours:    Temp:  [97 5 °F (36 4 °C)-98 2 °F (36 8 °C)] 98 2 °F (36 8 °C)  HR:  [67-87] 67  Resp:  [16-18] 18  BP: (104-114)/(66-70) 114/70    Laboratory results: I have personally reviewed all pertinent laboratory/tests results  Assessment/Plan   Principal Problem:    Mood disorder with psychosis ([facility] [address] )    Recommended Treatment:     Planned medication and treatment changes:  Continue Invega 3 mg and Zoloft 50 mg daily, continue melatonin 6 mg at bedtime  If patient has ongoing and sustained improvement we will plan for discharge in the next 24 to 48 hours  Referral for partial program upon discharge being pursued by case management      All current active medications have been reviewed  Encourage group therapy, milieu therapy and occupational therapy  Behavioral Health checks every 7 minutes  Current Facility-Administered Medications   Medication Dose Route Frequency Provider Last Rate   • acetaminophen  650 mg Oral Q6H PRN ÁNGELA Salazar     • acetaminophen  650 mg Oral Q4H PRN ÁNGELA Salazar     • acetaminophen  975 mg Oral Q6H PRN ÁNGELA Salazar     • aluminum-magnesium hydroxide-simethicone  30 mL Oral Q4H PRN ÁNGELA Salazar     • benztropine  1 mg Oral Q6H PRN [facility]el [facility] ÁNGELA DELACRUZ     • [START ON 5/15/2023] cholecalciferol  1,000 Units Oral Daily Alyce Bennett MD     • cyanocobalamin  500 mcg Oral Daily Alyce Bennett MD     • hydrOXYzine HCL  50 mg Oral Q6H PRN Max 4/day ÁNGELA Salazar      Or   • diphenhydrAMINE  50 mg Intramuscular Q6H PRN ÁNGELA Salazar     • ergocalciferol  50,000 Units Oral Weekly Alyce Bennett MD     • hydrOXYzine HCL  100 mg Oral Q6H PRN Max 4/day ÁNGELA Salazar      Or   • LORazepam  2 mg Intramuscular Q6H PRN ÁNGELA Salazar     • hydrOXYzine HCL  25 mg Oral Q6H PRN Max 4/day Romel [facility] ÁNGELA DELACRUZ     • lamoTRIgine  25 mg Oral Daily Trice Pham MD     • melatonin  6 mg Oral HS Parkerene ÁNGELA Raines     • OLANZapine  10 mg Oral Q3H PRN Max 3/day Floshericeene ÁNGELA Raines      Or   • OLANZapine  10 mg Intramuscular Q3H PRN Max 3/day Floshericeene ÁNGELA Raines     • OLANZapine  5 mg Oral Q3H PRN Max 6/day Parkerene ÁNGELA Raines      Or   • OLANZapine  5 mg Intramuscular Q3H PRN Max 6/day Karen M ÁNGELA Mathias     • OLANZapine  2 5 mg Oral Q3H PRN Max 8/day Floshericeene ÁNGELA Rianes     • paliperidone  3 mg Oral Daily Edie Lindsay MD     • polyethylene glycol  17 g Oral Daily PRN Floshericeene ÁNGELA Raines     • sertraline  50 mg Oral Daily Parkerene ÁNGELA Raines     • traZODone  100 mg Oral HS PRN Parkerene ÁNGELA Raines         Risks / Benefits of Treatment:    Risks, benefits, and possible side effects of medications explained to patient and patient verbalizes understanding and agreement for treatment  Counseling / Coordination of Care: Total floor / unit time spent today 35 minutes  Greater than 50% of total time was spent with the patient and / or family counseling and / or coordination of care  A description of counseling / coordination of care:  Patient's progress discussed with staff in treatment team meeting  Medications, treatment progress and treatment plan reviewed with patient      Trice Pham MD 04/25/23

## 2023-04-25 NOTE — PROGRESS NOTES
"Progress Note - Rory Halsted 25 y o  female MRN: [id number]    Unit/Bed#: [facility] 544-99 Encounter: [id number]        Subjective:   Patient seen and examined at bedside after reviewing the chart and discussing the case with the caring staff  Patient examined at bedside  Patient has no acute complaints  Physical Exam   Vitals: Blood pressure 114/70, pulse 67, temperature 98 2 °F (36 8 °C), temperature source Temporal, resp  rate 18, height 5' 6\" (1 676 m), weight 63 4 kg (139 lb 12 8 oz), last menstrual period 04/15/2023, SpO2 97 %  ,Body mass index is 22 56 kg/m²  Constitutional: Patient in no acute distress  HEENT: PERR, EOMI, MMM  Cardiovascular: Normal rate and regular rhythm  Pulmonary/Chest: Effort normal and breath sounds normal    Abdomen: Soft, + BS, NT  Assessment/Plan:  Rory Halsted is a(n) 25y o  year old female with schizoaffective disorder depressive type  1  Allergic rhinitis   Stable  2  Insomnia   Patient is on melatonin  3  Arthralgia/headaches   Tylenol as needed  4  Vitamin D deficiency  Patient is on vitamin D bolus doses for 10 weeks followed by vitamin D3 1000 units daily  5  Vitamin B12 deficiency  Patient is on vitamin B12 supplements  6  Hypotension  Lightheadedness seems to have resolved  Continue to monitor    "

## 2023-04-26 VITALS
HEART RATE: 61 BPM | WEIGHT: 139.8 LBS | HEIGHT: 66 IN | RESPIRATION RATE: 17 BRPM | TEMPERATURE: 97.7 F | BODY MASS INDEX: 22.47 KG/M2 | DIASTOLIC BLOOD PRESSURE: 55 MMHG | OXYGEN SATURATION: 97 % | SYSTOLIC BLOOD PRESSURE: 92 MMHG

## 2023-04-26 RX ORDER — ERGOCALCIFEROL 1.25 MG/1
50000 CAPSULE ORAL WEEKLY
Qty: 10 CAPSULE | Refills: 0 | Status: SHIPPED | OUTPATIENT
Start: 2023-04-26 | End: 2023-06-29

## 2023-04-26 RX ORDER — MELATONIN
1000 DAILY
Qty: 30 TABLET | Refills: 0 | Status: SHIPPED | OUTPATIENT
Start: 2023-05-15

## 2023-04-26 RX ADMIN — CYANOCOBALAMIN TAB 500 MCG 500 MCG: 500 TAB at 08:37

## 2023-04-26 RX ADMIN — SERTRALINE HYDROCHLORIDE 50 MG: 50 TABLET ORAL at 08:37

## 2023-04-26 RX ADMIN — PALIPERIDONE 3 MG: 3 TABLET, EXTENDED RELEASE ORAL at 08:37

## 2023-04-26 NOTE — BH TRANSITION RECORD
Contact Information: If you have any questions, concerns, pended studies, tests and/or procedures, or emergencies regarding your inpatient behavioral health visit  Please contact Susie Batres" [facility] behavioral health unit [phone] and ask to speak to a , nurse or physician  A contact is available 24 hours/ 7 days a week at this number  Summary of Procedures Performed During your Stay:  Below is a list of major procedures performed during your hospital stay and a summary of results:  - No major procedures performed  Pending Studies (From admission, onward)    None        Please follow up on the above pending studies with your PCP and/or referring provider

## 2023-04-26 NOTE — PLAN OF CARE
Problem: DISCHARGE PLANNING - CARE MANAGEMENT  Goal: Discharge to post-acute care or home with appropriate resources  Description: INTERVENTIONS:  - Conduct assessment to determine patient/family and health care team treatment goals, and need for post-acute services based on payer coverage, community resources, and patient preferences, and barriers to discharge  - Address psychosocial, clinical, and financial barriers to discharge as identified in assessment in conjunction with the patient/family and health care team  - Arrange appropriate level of post-acute services according to patient’s   needs and preference and payer coverage in collaboration with the physician and health care team  - Communicate with and update the patient/family, physician, and health care team regarding progress on the discharge plan  - Arrange appropriate transportation to post-acute venues  Outcome: Completed     Pt dc today 12pm to family with virtual PHP starting Friday, [facility] med mgmt scheduled for end of May, declined pcp appt

## 2023-04-26 NOTE — PROGRESS NOTES
"Progress Note - Jem Michael 25 y o  female MRN: [id number]    Unit/Bed#: [facility] 517-69 Encounter: [id number]        Subjective:   Patient seen and examined at bedside after reviewing the chart and discussing the case with the caring staff  Patient examined at bedside  Patient has no acute complaints  Patient is being discharged today, Wednesday 4/26/2023  Physical Exam   Vitals: Blood pressure 92/55, pulse 61, temperature 97 7 °F (36 5 °C), temperature source Tympanic, resp  rate 17, height 5' 6\" (1 676 m), weight 63 4 kg (139 lb 12 8 oz), last menstrual period 04/15/2023, SpO2 97 %  ,Body mass index is 22 56 kg/m²  Constitutional: Patient in no acute distress  HEENT: PERR, EOMI, MMM  Cardiovascular: Normal rate and regular rhythm  Pulmonary/Chest: Effort normal and breath sounds normal    Abdomen: Soft, + BS, NT  Assessment/Plan:  Jem Michael is a(n) 25y o  year old female with schizoaffective disorder depressive type  MEDICAL CLEARANCE: Patient is medically cleared for discharge  All scripts were sent out for the patient  1  Allergic rhinitis   Stable  2  Insomnia   Patient is on melatonin  3  Arthralgia/headaches   Tylenol as needed  4  Vitamin D deficiency  Patient is on vitamin D bolus doses for 10 weeks followed by vitamin D3 1000 units daily  5  Vitamin B12 deficiency  Patient is on vitamin B12 supplements  6  Hypotension  Lightheadedness seems to have resolved  Continue to monitor  The patient was discussed with Dr Zion Ozuna and he is in agreement with the above note    "

## 2023-04-26 NOTE — PROGRESS NOTES
04/26/23 0730   Activity/Group Checklist   Group   (Morning Meeting and Coffee)   Attendance Attended   Attendance Duration (min) 46-60   Interactions Interacted appropriately   Affect/Mood Blunted/flat;Calm; Appropriate   Goals Achieved Identified feelings; Able to listen to others; Able to engage in interactions; Able to reflect/comment on own behavior;Able to manage/cope with feelings

## 2023-04-26 NOTE — NURSING NOTE
Patient was visible on unit, went to bed after snack  Denies any SI/HI, AV/H, anxiety and depression  Patient stated happy for discharge tomorrow  Patient stated things are going to be different this time, will be attending outpatient day program, will continue taking her prescribed medication upon discharge as she feels this admission medication regiment is working as she is feeling better and has her support system in place  Medication compliant

## 2023-04-26 NOTE — DISCHARGE SUMMARY
"Discharge Summary - Catrachita Pickens Annie [age] y o  female MRN: [id number]  Unit/Bed#: [facility] 141-51 Encounter: [id number]     Admission Date: 4/18/2023         Discharge Date: April 26, 2023    Attending Psychiatrist: Trish Arriaga*    Reason for Admission/HPI: Major depressive disorder, single episode, unspecified [F32 9]  Major depressive disorder [F32 9]    Patient is a 25 y o  female presented with worsening depression, thoughts of self-harm and poor functioning  Patient was admitted between March 11 and 14 again between March 27 and April 10  She returned to the hospital on April 18  Her presentation on all admissions was similar and due to worsening depression  On this admission patient reported she was discharged and felt her medications were not working  She stopped Risperdal, which she was discharged on with Zoloft, due to feeling drowsy and like \"a zombie\"  He did continue the Zoloft but did not feel that it was benefiting her significantly in terms of depression as she remained depressed, unmotivated and had thoughts of harming herself  Patient also presented with distorted thinking and mild paranoia  With evaluation on this admission patient was started on Invega 3 mg daily  Her Zoloft was tapered slightly down from 100 down to 50 mg daily  Patient continued to appear blunted but subjectively did state she was feeling much better on this medication regimen  On option to start Lamictal for improvement of depression further was given to patient however she was not amenable to wanting this medication  Through hospitalization patient did show improvement  She stated she felt much more motivated and had decreased anxiety  She reported feeling better than she had in many months  She denied any thoughts of self-harm  She reported her greatest stressor as being work and had plans to resign for her job, reported this gave her a sense of great relief    Patient's symptoms " improved gradually over the hospital course  At the end of treatment the patient was doing well  Mood was stable at the time of discharge  The patient denied suicidal ideation, intent or plan at the time of discharge and denied homicidal ideation, intent or plan at the time of discharge  There was no overt psychosis at the time of discharge  Sleep and appetite were improved  The patient was tolerating medications and was not reporting any significant side effects at the time of discharge  Since the patient was doing well at the end of the hospitalization, treatment team felt that the patient could be safely discharged to outpatient care  Patient      The outpatient follow up with [facility] hospital program as well as her outpatient provider once this is completed at [facility] was arranged by the unit  upon discharge      Mental Status at time of Discharge:     Appearance:  casually dressed, adequate grooming, looks stated age   Behavior:  pleasant, cooperative   Speech:  slow, soft   Mood:  mildly anxious   Affect:  mildly constricted   Language: naming objects   Thought Process:  goal directed   Associations: intact associations   Thought Content:  no overt delusions   Perceptual Disturbances: no auditory hallucinations, no visual hallucinations   Risk Potential: Suicidal ideation - None at present  Homicidal ideation - None at present  Potential for aggression - No   Sensorium:  oriented to person, place and time/date   Memory:  recent and remote memory grossly intact   Consciousness:  alert and awake   Attention: attention span and concentration are age appropriate   Intellect: within normal limits   Fund of Knowledge: awareness of current events: yes   Insight:  fair   Judgment: fair   Muscle Strength Muscle Tone: normal  normal   Gait/Station: normal gait/station   Motor Activity: no abnormal movements       Admission Diagnosis:Major depressive disorder, single episode, unspecified [F32 9]  Major depressive disorder [F32 9]    Discharge Diagnosis:   Principal Problem:    Mood disorder with psychosis ([facility] [address] )  Resolved Problems:    * No resolved hospital problems  *        Lab results:  No visits with results within 1 Day(s) from this visit     Latest known visit with results is:   Admission on 04/17/2023, Discharged on 04/18/2023   Component Date Value   • Color, UA 04/17/2023 Yellow    • Clarity, UA 04/17/2023 Clear    • Specific Gravity, UA 04/17/2023 1 020    • pH, UA 04/17/2023 7 0    • Leukocytes, UA 04/17/2023 Negative    • Nitrite, UA 04/17/2023 Negative    • Protein, UA 04/17/2023 Negative    • Glucose, UA 04/17/2023 Negative    • Ketones, UA 04/17/2023 Negative    • Urobilinogen, UA 04/17/2023 0 2    • Bilirubin, UA 04/17/2023 Negative    • Occult Blood, UA 04/17/2023 Negative    • Amph/Meth UR 04/17/2023 Negative    • Barbiturate Ur 04/17/2023 Negative    • Benzodiazepine Urine 04/17/2023 Positive (A)    • Cocaine Urine 04/17/2023 Negative    • Methadone Urine 04/17/2023 Negative    • Opiate Urine 04/17/2023 Negative    • PCP Ur 04/17/2023 Negative    • THC Urine 04/17/2023 Negative    • Oxycodone Urine 04/17/2023 Negative    • EXT Preg Test, Ur 04/17/2023 Negative    • Control 04/17/2023 Valid    • SARS-CoV-2 04/17/2023 Negative    • INFLUENZA A PCR 04/17/2023 Negative    • INFLUENZA B PCR 04/17/2023 Negative    • RSV PCR 04/17/2023 Negative    • EXTBreath Alcohol 04/17/2023 0 000    • WBC 04/17/2023 5 83    • RBC 04/17/2023 4 14    • Hemoglobin 04/17/2023 13 6    • Hematocrit 04/17/2023 42 3    • MCV 04/17/2023 102 (H)    • MCH 04/17/2023 32 9    • MCHC 04/17/2023 32 2    • RDW 04/17/2023 12 0    • MPV 04/17/2023 10 5    • Platelets 21/74/0747 190    • nRBC 04/17/2023 0    • Neutrophils Relative 04/17/2023 76 (H)    • Immat GRANS % 04/17/2023 0    • Lymphocytes Relative 04/17/2023 19    • Monocytes Relative 04/17/2023 5    • Eosinophils Relative 04/17/2023 0    • Basophils Relative 04/17/2023 0    • Neutrophils Absolute 04/17/2023 4 39    • Immature Grans Absolute 04/17/2023 0 01    • Lymphocytes Absolute 04/17/2023 1 10    • Monocytes Absolute 04/17/2023 0 30    • Eosinophils Absolute 04/17/2023 0 01    • Basophils Absolute 04/17/2023 0 02    • Sodium 04/17/2023 136    • Potassium 04/17/2023 4 3    • Chloride 04/17/2023 104    • CO2 04/17/2023 27    • ANION GAP 04/17/2023 5    • BUN 04/17/2023 17    • Creatinine 04/17/2023 0 67    • Glucose 04/17/2023 131    • Calcium 04/17/2023 8 9    • AST 04/17/2023 17    • ALT 04/17/2023 25    • Alkaline Phosphatase 04/17/2023 33 (L)    • Total Protein 04/17/2023 6 6    • Albumin 04/17/2023 4 1    • Total Bilirubin 04/17/2023 0 91    • eGFR 04/17/2023 118    • TSH 3RD GENERATON 04/17/2023 0 972        Discharge Medications:  Current Discharge Medication List      START taking these medications    Details   paliperidone (INVEGA) 3 mg 24 hr tablet Take 1 tablet (3 mg total) by mouth daily Do not start before April 26, 2023  Qty: 30 tablet, Refills: 0    Associated Diagnoses: Mood disorder with psychosis ([facility] [address] )            Current Discharge Medication List      STOP taking these medications       risperiDONE (RisperDAL) 2 mg tablet Comments:   Reason for Stopping:              Current Discharge Medication List      CONTINUE these medications which have CHANGED    Details   melatonin 3 mg Take 2 tablets (6 mg total) by mouth daily at bedtime for 30 doses  Qty: 60 tablet, Refills: 0    Associated Diagnoses: Insomnia due to stress      sertraline (ZOLOFT) 50 mg tablet Take 1 tablet (50 mg total) by mouth daily Do not start before April 26, 2023    Qty: 30 tablet, Refills: 0    Associated Diagnoses: JAIME (generalized anxiety disorder)            Current Discharge Medication List      CONTINUE these medications which have NOT CHANGED    Details   cholecalciferol (VITAMIN D3) 1,000 units tablet Take 1 tablet (1,000 Units total) by mouth daily Do not start before May 15, 2023  Qty: 30 tablet, Refills: 0    Associated Diagnoses: Vitamin D deficiency      cyanocobalamin (VITAMIN B-12) 500 MCG tablet Take 1 tablet (500 mcg total) by mouth daily Do not start before April 10, 2023  Qty: 30 tablet, Refills: 0    Associated Diagnoses: Vitamin B12 deficiency      ergocalciferol (VITAMIN D2) 50,000 units Take 1 capsule (50,000 Units total) by mouth once a week for 10 doses  Qty: 10 capsule, Refills: 0    Associated Diagnoses: Vitamin D deficiency              Discharge instructions/Information to patient and family:   See after visit summary for information provided to patient and family  Provisions for Follow-Up Care:  See after visit summary for information related to follow-up care and any pertinent home health orders  Discharge Statement   I spent 45 minutes discharging the patient  This time was spent on the day of discharge  I had direct contact with the patient on the day of discharge  Additional documentation is required if more than 30 minutes were spent on discharge

## 2023-04-26 NOTE — NURSING NOTE
"  Pediatric Panel Management Review      Patient has the following on her problem list: {Peds Problems to Review:955652}    Summary:    Patient is due/failing the following:   {[facility] Peds Due Items:575831}.    Action needed:   {[facility] Action:347511}.    Type of outreach:    {PEDS [facility] PT CONTACT:785354}    Questions for provider review:    {NONE:270128::\"None\"}.                                                                                                                                    {staff signature}     Chart routed to {Peds Care Team / Provider:308783} .            " Patient has been sleeping without interruption all night  No s/s of distress    Monitoring continues

## 2023-04-26 NOTE — PLAN OF CARE
Problem: Alteration in Thoughts and Perception  Goal: Treatment Goal: Gain control of psychotic behaviors/thinking, reduce/eliminate presenting symptoms and demonstrate improved reality functioning upon discharge  Outcome: Progressing  Goal: Verbalize thoughts and feelings  Description: Interventions:  - Promote a nonjudgmental and trusting relationship with the patient through active listening and therapeutic communication  - Assess patient's level of functioning, behavior and potential for risk  - Engage patient in 1 on 1 interactions  - Encourage patient to express fears, feelings, frustrations, and discuss symptoms    - Bend patient to reality, help patient recognize reality-based thinking   - Administer medications as ordered and assess for potential side effects  - Provide the patient education related to the signs and symptoms of the illness and desired effects of prescribed medications  Outcome: Progressing  Goal: Refrain from acting on delusional thinking/internal stimuli  Description: Interventions:  - Monitor patient closely, per order   - Utilize least restrictive measures   - Set reasonable limits, give positive feedback for acceptable   - Administer medications as ordered and monitor of potential side effects  Outcome: Progressing     Problem: Ineffective Coping  Goal: Cooperates with admission process  Description: Interventions:   - Complete admission process  Outcome: Progressing  Goal: Identifies ineffective coping skills  Outcome: Progressing  Goal: Identifies healthy coping skills  Outcome: Progressing  Goal: Demonstrates healthy coping skills  Outcome: Progressing  Goal: Participates in unit activities  Description: Interventions:  - Provide therapeutic environment   - Provide required programming   - Redirect inappropriate behaviors   Outcome: Progressing     Problem: Risk for Self Injury/Neglect  Goal: Treatment Goal: Remain safe during length of stay, learn and adopt new coping skills, and be free of self-injurious ideation, impulses and acts at the time of discharge  Outcome: Progressing  Goal: Verbalize thoughts and feelings  Description: Interventions:  - Assess and re-assess patient's lethality and potential for self-injury  - Engage patient in 1:1 interactions, daily, for a minimum of 15 minutes  - Encourage patient to express feelings, fears, frustrations, hopes  - Establish rapport/trust with patient   Outcome: Progressing  Goal: Refrain from harming self  Description: Interventions:  - Monitor patient closely, per order  - Develop a trusting relationship  - Supervise medication ingestion, monitor effects and side effects   Outcome: Progressing     Problem: Depression  Goal: Treatment Goal: Demonstrate behavioral control of depressive symptoms, verbalize feelings of improved mood/affect, and adopt new coping skills prior to discharge  Outcome: Progressing  Goal: Verbalize thoughts and feelings  Description: Interventions:  - Assess and re-assess patient's level of risk   - Engage patient in 1:1 interactions, daily, for a minimum of 15 minutes   - Encourage patient to express feelings, fears, frustrations, hopes   Outcome: Progressing  Goal: Refrain from isolation  Description: Interventions:  - Develop a trusting relationship   - Encourage socialization   Outcome: Progressing  Goal: Refrain from self-neglect  Outcome: Progressing

## 2023-04-26 NOTE — PROGRESS NOTES
04/26/23   Team Meeting   Meeting Type Daily Rounds   Team Members Present   Team Members Present Physician;Nurse;   Physician Team Member Dr Niranjan Marshall MD; HOSP DR ESTELLE LOPEZ, [address] [place] [address]   Nursing Team Member Eugenia Johnson RN; Layla Flores [facility]   Social Work Team Member Asia Quiroz; Maritza [place]   Patient/Family Present   Patient Present No   Patient's Family Present No     Dc today 12pm to family with virtual PHP starting Friday, [facility] mgmt scheduled for after PHP, pcp appt declined

## 2023-04-26 NOTE — NURSING NOTE
H review belonging with patient  All items accounted for  Discharge instructions and medication review with  patient, verbalize understanding  E-prescription sent to pharmacy  Patient was escort by T to lobby instable condition  for transport

## 2023-04-26 NOTE — PROGRESS NOTES
Patient going home with the following items    3 t shirts 1 maoon  pants 1 green pants 1 black pants 3 under ware 3 pair of socks 3 bras 1 hard cover book 1 sweater hair brush slip on shoes 1 word search journal old spice deodorant lotion    Contraband  White slippers 6 puzzle books 1 hard cover book 1 bra 2 t shirts 1 pair of socks 1 pair of under ware  9 Maxi pads 13 small pads and 5 hair ties

## 2023-04-28 ENCOUNTER — OFFICE VISIT (OUTPATIENT)
Dept: PSYCHOLOGY | Facility: CLINIC | Age: 28
End: 2023-04-28

## 2023-04-28 ENCOUNTER — TELEMEDICINE (OUTPATIENT)
Dept: PSYCHIATRY | Facility: CLINIC | Age: 28
End: 2023-04-28

## 2023-04-28 DIAGNOSIS — F41.1 GAD (GENERALIZED ANXIETY DISORDER): ICD-10-CM

## 2023-04-28 DIAGNOSIS — F39 MOOD DISORDER WITH PSYCHOSIS (HCC): Primary | ICD-10-CM

## 2023-04-28 NOTE — PSYCH
Subjective:     Patient ID: Anastasia Martinez is a 25 y o  female  [facility] Clinical Progress Notes      Specialized Services Documentation  Therapist must complete separate progress note for each specific clinical activity in which the individual participated during the day  Group Psychotherapy  This group was facilitated virtually in a private office using HIPAA Compliant and Approved [employer] Teams  Anastasia Martinez consented to the use of tele-video modality of treatment  ([id number]) Anastasia Martinez did not attend group on the Part 1 of Wellness Recovery Action Plan due to intake  Group members were educated on the background of the WRAP  Members were informed WRAP was emailed to them  Writer explained the benefit of utilizing the WRAP prior to members initiating it   Members then focused developing the following portions of WRAP:   · the wellness toolbox    Treatment Plan Objectives: 1 1, 1 2  Therapist: Domenico RODRIGUEZ RN

## 2023-04-28 NOTE — BH TREATMENT PLAN
"Assessment/Plan:      Diagnoses and all orders for this visit:    Mood disorder with psychosis ([address] )    JAIME (generalized anxiety disorder)          Subjective:     Patient ID: Quintin Morse is a 25 y o  female  [facility] Treatment Plan   AREAS OF NEED: Depression and anxiety as evidenced by panic attacks, isolation, low motivation, and lack of interest due to work stress, financial stress and multiple hospitalizations  Date Initiated: 04/28/23    Strengths: \"organized, efficient, and a problem solver\"     LONG TERM GOAL:   Date Initiated: 04/28/23  1 0 I will identify three ways that my overall well being has improved since attending [facility]  Target Date: 05/26/23  Completion Date:       SHORT TERM OBJECTIVES:     Date Initiated: 04/28/23  1 1 I will participate at least twice in each group throughout the day in order to increase my ability to share in group settings  Revision Date:   Target Date: 05/09/23  Completion Date:     Date Initiated: 04/28/23  1 2 I will practice and implement at least three positive affirmations daily in order to increase my self-esteem and replace negative thoughts with positive thoughts  Revision Date:   Target Date: 05/09/23  Completion Date:    Date Initiated: 04/28/23  1 3 I will take medications as prescribed and share questions and concerns if arise  Revision Date:  Target Date: 05/09/23  Completion Date:     Date Initiated: 04/28/23  1 4 I will identify 3 ways my supports can assist in my recovery and agree to staff/support contact as indicated      Revision Date:  Target Date: 05/09/23  Completion Date:          7 DAY REVISION:    Date Initiated:  Revision Date:   Target Date:   Completion Date:      PSYCHIATRY:  Date Initiated:  04/28/23  Medication Management and Education       Revision Date:       The person(s) responsible for carrying out the plan is Dr Sandro Joyner,     NURSING/SYMPTOM EDUCATION:  Date Initiated: 04/28/23       1 1, 1 2  1 3, 1 4 " Provide wellness/symptoms and skill education groups three to five days weekly to educate Suresh Lazaro on signs and symptoms of diagnoses, skills to manage stressors, and medication questions that will be addressed by the treatment team         Revision date: The person(s) responsible for carrying out the plan is Deacon Hilario RN, BSN    PSYCHOLOGY:   Date Initiated: 23       1 1, 1 2, 1 4 Provide psychotherapy group 5 times per week to allow opportunity for Suresh Lazaro  to explore stressors and ways of coping  Revision Date:   The person(s) responsible for carrying out the plan is Erica Apgar, LSW; Maria A Goddard [place]    ALLIED THERAPY:   Date Initiated: 23  1 1,1 2 Engage Suresh Lazaro in AT group 5 times daily to encourage development and use of wellness tools to decrease symptoms and promote recovery through meaningful activity  Revision Date:       The person(s) responsible for carrying out the plan is JOSESITO Turner, JOSESITO Fuller    CASE MANAGEMENT:   Date Initiated: 23      1 0 This  will meet with Suresh Lazaro  3-4 times weekly to assess treatment progress, discharge planning, connection to community supports and UR as indicated  Revision Date:   The person(s) responsible for carrying out the plan is JOSESITO Turner    TREATMENT REVIEW/COMMENTS:     DISCHARGE CRITERIA: Identify 3 signs of progress and complete relapse prevention plan  DISCHARGE PLAN: Connect with identified outpatient providers  Estimated Length of Stay: 10 treatment days       Diagnosis and Treatment Plan explained to Sarmad Bañuelos relates understanding diagnosis and is agreeable to Treatment Plan  CLIENT COMMENTS / Please share your thoughts, feelings, need and/or experiences regarding your treatment plan:     Signatures can be found in the [facility] Treatment Plan consents

## 2023-04-28 NOTE — PSYCH
Other As per insurance verification sheet from , no authorization required for PHP  Education Therapy   [id number] Kyle Ye was excused to attend intake evaluations  [address] engaged throughout the treatment day  Was engaged in learning related to Illness, Medication, Aftercare and Wellness Tools  Staff utilized Verbal, Written, A/V and Demonstration teaching methods  Kyle Ye shared area of learning and set a goal for outside of program to review what was learned  Tx Plan Objective: 1 1,1 2,1 4, Therapist:  JOSESITO Purvis    Case Management Note    JOSESITO Purvis    Current suicide risk : Low    [id number] Met with@ via TEAMS  Reviewed program, initial paperwork reviewed: Consent for Treatment, PHP handbook, HIPPA, General Consent, Client Bill of Rights, and Smoking/Drug and Alcohol Policy  Release of Information obtained for emergency contact - Maxine Ho [phone] and PCP and Health Care Coordination Form  Kyle Ye has hard copies of all paperwork and verbally gave consent  Reviewed and given on call number  PCP notified of admission and health care coordination form sent  Completed initial psycho-social evaluation and initial treatment goals discussed  I,Lizette Hicks,am physically unable to provide a signature; however, I understand the nature of and am in agreement with the documentation presented to me via TEAMS  I have received a copy through My Chart and/or the [address] [facility] Postal Service  I freely give verbal consent  Name of Document (s):  Consent for Treatment, PHP handbook, HIPPA, General Consent, Client Bill of Rights, and Smoking/Drug and Alcohol Policy  Release of Information obtained for emergency contact, PCP and Health Care Coordination Form  Witness to verbal consent: JOSESITO Purvis  Witness to verbal consent: MONI Sorenson      Medications changes/added/denied?  No - See Dr Marquis Wei' Note  Treatment session number: Assessment and day 1    Individual Case Management Visit provided today? No    [facility] follow up physician's orders: Admit to PHP - See Dr Karson Padilla note

## 2023-04-28 NOTE — PSYCH
Initial Psychiatric Evaluation- [facility] [place] [facility]  Isrealevelynehoward Barthrosalio 25 y o  female MRN: [id number]      REQUIRED DOCUMENTATION:      1  This service was provided via Telemedicine  2  Provider located at [facility]  3  TeleMed provider: Seymour Ponce DO  4  Patient located in [place], for which this provider is a licensed medical practitioner  5  Identify all parties in room with patient during tele consult: none  6  Patient was then informed that this was a Telemedicine visit and that the exam was being conducted confidentially over secure lines  My office door was closed  No one else was in the room  Patient acknowledged consent and understanding of privacy and security of the Telemedicine visit, and gave us permission to have the assistant stay in the room in order to assist with the history and to conduct the exam   I informed the patient that I have reviewed their record in Epic and presented the opportunity for them to ask any questions regarding the visit today  The patient agreed to participate  Visit Time    Visit Start Time: 0830  Visit Stop Time: 4728  Total Visit Duration: 40 minutes    Virtual Regular Visit    Verification of patient location:    Patient is located in the following state in which I hold an active license [place]    Assessment/Plan:    Problem List Items Addressed This Visit        Other    Mood disorder with psychosis ([facility] [address] ) - Primary    JAIME (generalized anxiety disorder)       Reason for visit is No chief complaint on file  Encounter provider Evon Morataya DO    Provider located at  [address] [place] [address] E  [address] [facility] [address] [phone]    Recent Visits  No visits were found meeting these conditions  Showing recent visits within past 7 days and meeting all other requirements  Future Appointments  No visits were found meeting these conditions    Showing future appointments "within next 150 days and meeting all other requirements       The patient was identified by name and date of birth  Veronique Queen was informed that this is a telemedicine visit and that the visit is being conducted through the [facility]  She agrees to proceed     My office door was closed  No one else was in the room  She acknowledged consent and understanding of privacy and security of the video platform  The patient has agreed to participate and understands they can discontinue the visit at any time  Patient is aware this is a billable service  HPI     Veronique Queen is a 25 y o  female with past psychiatric history of depression and anxiety is admitted to [facility] referred by [facility]  Alona Song reports she was recently discharged from hospital, after third admission this year 2023  States she initially had depression and was on Prozac, but caused delusional thoughts  States she tried to follow up after first hospitalization with [facility], and was still delusional, was on Abilify  Was having delusions that people were sending messages to her via [employer]  States she did notice she was also having a decreased need for sleep, and was obsessive with wanting to listen to [employer]  Also was restricting her eating during that time, very obsessive  Was hospitalized a second time  States she was on Risperdal and Zoloft during second hospitalization, but found risperdal caused side effects  Was tapered off in the outpatient setting, but still was having side effects, was having \"panic attacks\" and suicidal thoughts  States on third hospitalization, was started on Invega and seems \"much better\" and also on Zoloft 50mg daily  States she feels the best she has ever felt  States she has struggled with social anxiety much of her life, as well as some mild depression    States she has been feeling like everything was a routine and \"just same thing every " "day\"  States that 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  States work is stressful and not rewarding  States she is planning on quitting  She states she is not sure what her dream job would be  Currently living with parents, has one sister, who lives a few blocks away  She had went to [employer]  Graduated in mathematics  Did not fird job first year out, and felt depressed  States she does struggle with social anxiety and can be \"avoidant\" at times  States she feels it comes from low self-esteem  She admits she does not have many friends, and can be avoidant at times  She states that she does want to try and meet more people  She states she does not have many friends outside of her immediate family, has not kept in touch with people from college  She states this is something she would like to work on in the [address] [place] [address] include social anxiety, job stress       Psychiatric Review Of Systems:    Appetite: no change  Adverse eating: no  Weight changes: no  Insomnia/sleeplessness: no  Fatigue/anergy: no  Anhedonia/lack of interest: yes, but improving  Attention/concentration: no change  Psychomotor agitation/retardation: no  Somatic symptoms: no  Anxiety/panic attack: worrying  Crystal/hypomania: history of periods of elevated mood, lasting several days in a row  Hopelessness/helplessness/worthlessness: no  Self-injurious behavior/high-risk behavior: no  Suicidal ideation: no  Homicidal ideation: no  Auditory hallucinations: no  Visual hallucinations: no  Other perceptual disturbances: no  Delusional thinking: ideas of reference, yes, but resolved  Obsessive/compulsive symptoms: denies currently, obsessive thoughts, when was delusional      Review Of Systems:    Constitutional negative   ENT negative   Cardiovascular negative   Respiratory negative   Gastrointestinal negative   Genitourinary negative   Musculoskeletal negative   Integumentary negative " Neurological negative   Endocrine negative   Pain none   Pain Level    0/10   Other Symptoms none, all other systems are negative       Past Psychiatric History:     Previous inpatient psychiatric admissions: three times at [address] [place] [address] between February to April 2023  Previous inpatient/outpatient substance abuse rehabilitation: none  Present/previous outpatient psychiatric treatment: [facility] just recently  Present/previous psychotherapy: none  History of suicidal attempts/gestures: none  History of violence/aggressive behaviors: none  Past Psychiatric Medication Trials:    Prozac and Risperdal made things worse      Medications:     Current Outpatient Medications:   •  [START ON 5/15/2023] cholecalciferol (VITAMIN D3) 1,000 units tablet, Take 1 tablet (1,000 Units total) by mouth daily Do not start before May 15, 2023 , Disp: 30 tablet, Rfl: 0  •  cyanocobalamin (VITAMIN B-12) 500 MCG tablet, Take 1 tablet (500 mcg total) by mouth daily, Disp: 30 tablet, Rfl: 0  •  ergocalciferol (VITAMIN D2) 50,000 units, Take 1 capsule (50,000 Units total) by mouth once a week for 10 doses, Disp: 10 capsule, Rfl: 0  •  melatonin 3 mg, Take 2 tablets (6 mg total) by mouth daily at bedtime for 30 doses, Disp: 60 tablet, Rfl: 0  •  paliperidone (INVEGA) 3 mg 24 hr tablet, Take 1 tablet (3 mg total) by mouth daily Do not start before April 26, 2023 , Disp: 30 tablet, Rfl: 0  •  sertraline (ZOLOFT) 50 mg tablet, Take 1 tablet (50 mg total) by mouth daily Do not start before April 26, 2023 , Disp: 30 tablet, Rfl: 0    Family Psychiatric History:     Family History   Problem Relation Age of Onset   • No Known Problems Mother    • No Known Problems Father    • Lymphoma Maternal Grandmother    • Diabetes Maternal Grandmother    • Prostate cancer Maternal Grandfather        Social History:  Education: college graduate  Learning Disabilities: none  Marital history: single  Living arrangement, social support: The patient lives in home with parents  Support systems: sister lives nearby  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  Functioning Relationships: good support system  Other Pertinent History: None  Access to guns/weapons: none    Social History     Substance and Sexual Activity   Drug Use Not Currently       Traumatic History:   Abuse: denies  Other Traumatic Events: denies    Substance Abuse History:  Denies any history of substance abuse; does vape nicotine daily          Past Medical History:   Diagnosis Date   • Anxiety    • Depression    • Panic attack    • Psychiatric disorder       Mental Status Evaluation:    Appearance age appropriate, casually dressed   Behavior cooperative, mildly anxious, still at times guarded, good eye contact   Speech normal rate, normal volume, normal pitch   Mood improved, still at times anxious   Affect normal range and intensity, appropriate   Thought Processes organized, goal directed   Associations intact associations   Thought Content no overt delusions   Perceptual Disturbances: no auditory hallucinations, no visual hallucinations   Abnormal Thoughts  Risk Potential Suicidal ideation - None  Homicidal ideation - None  Potential for aggression - No   Orientation oriented to person, place, time/date and situation   Memory recent and remote memory grossly intact   Consciousness alert and awake   Attention Span Concentration Span attention span and concentration are age appropriate   Intellect appears to be of average intelligence   Insight intact   Judgement intact   Muscle Strength and  Gait normal muscle strength and normal muscle tone, normal gait and normal balance   Motor Activity no abnormal movements   Language no difficulty naming common objects, no difficulty repeating a phrase, no difficulty writing a sentence   Fund of Knowledge adequate knowledge of current events  adequate fund of knowledge regarding past history  adequate fund of knowledge regarding vocabulary            Laboratory Results: I have personally reviewed all pertinent laboratory/tests results  Assessment:    Diagnoses and all orders for this visit:    Mood disorder with psychosis ([address] )    JAIME (generalized anxiety disorder)    Sarmad is a 78-year-old female who has a history of some depression in the past but did not have a full episode of depression, recently having worsening symptoms and was experiencing decreased need for sleep without increased impulsivity or elevated energy  She was experiencing delusions at this time, and feels it was due to Prozac  She has been stabilized now on Invega 3 mg daily and Zoloft 50 mg daily  She does appear very anxious, and would benefit from further psychotherapy to help address this and address her low self-esteem  She does not appear to be in acute danger to herself or others, and appears positive and motivated  Difficult to discern diagnosis other than generalized anxiety disorder, as she does not have a pure timeline of manic symptoms during these episodes  She may have had a brief psychotic episode, or could be due to depression  However, she did not have inability to work during her delusions, was noticing decreased need for sleep during this time  May need further assessment with outpatient setting and monitoring long-term  Plan:  Medication changes: No medication changes at this time, continue with Invega 3 mg daily and Zoloft 50 mg daily for treatment of her mood disorder  Risks, benefits, and possible side effects of medications explained to patient and patient verbalizes understanding  Controlled Medication Discussion: Not applicable    Patient advised to call 911 if feeling suicidal or homicidal before acting out on their thoughts and they expressed understanding  [facility] Physician's Orders     Admit to: Partial Hospitalization, 5 x per week, for 10 days     Vital signs daily for three days and then as needed  Diet Regular  Group Psychotherapy 5 x per week  Wellness Group 5 x per week  Individual Therapy as needed  Family Therapy as needed  Diagnosis:   1  Mood disorder with psychosis ([address] )        2  JAIME (generalized anxiety disorder)          This note was not shared with the patient due to this is a psychotherapy note      “I certify that the continuation of Partial Hospitalization services is medically necessary to improve and/or maintain the patient’s condition and functional level, and to prevent relapse or hospitalization, and that this could not be done at a less intensive level of care ”       Fernando Ang verbally agrees to participate in [employer]  Pt is aware that [employer] could be limited without vital signs or the ability to perform a full hands-on physical exam  Bryan Vargas understands she or the provider may request at any time to terminate the video visit and request the patient to seek care or treatment in person

## 2023-04-28 NOTE — PSYCH
Subjective:    Patient ID: Jumana Galloway is a 25 y o  female      [facility] Clinical Progress Notes      Specialized Services Documentation  Therapist must complete separate progress note for each specific clinical activity in which the individual participated during the day  Group Psychotherapy  Jumana Galloway consented to the use of tele-video modality of treatment  [id number] This group was facilitated virtually in a private office using HIPAA Compliant and Approved [employer] Teams  Jumana Galloway participated actively in the wellness assessment, which evaluates progress on several different areas of wellness/wellbeing: physical, emotional, cognitive, vocational, social and spiritual  Clients rated their progress and discussed areas that need work  By completing and discussing areas of progress and challenges, members are connected and reminded that, in their mental health struggle, they are not alone  Topics of discussion revolved around positive experiences within each area of wellness as well as the challenging aspects to wellness within their past week  Continue to note progress towards goals  Continue with psychotherapy to encourage the development and practice of reflecting on their mental health journey to alleviate symptoms and support wellness    Tx Plan Objective 1 1, 1 2, 1 4 Therapist: CHRISTIANO Wang

## 2023-04-28 NOTE — PSYCH
"Assessment/Plan:      Diagnoses and all orders for this visit:    Mood disorder with psychosis ([address] )    JAIME (generalized anxiety disorder)          Subjective:     Patient ID: Dee Dee Andrea is a 25 y o  female  HPI:     Pre-morbid level of function and History of Present Illness:  As per Dr Karson Padilla, DO:   Dee Dee Andrea is a 25 y o  female with past psychiatric history of depression and anxiety is admitted to [facility] referred by [facility] inpatient unit      Julia Herrera reports she was recently discharged from hospital, after third admission this year 2023  States she initially had depression and was on Prozac, but caused delusional thoughts  States she tried to follow up after first hospitalization with [facility], and was still delusional, was on Abilify  Was having delusions that people were sending messages to her via Spot[employer]  States she did notice she was also having a decreased need for sleep, and was obsessive with wanting to listen to Spotify  Also was restricting her eating during that time, very obsessive  Was hospitalized a second time  States she was on Risperdal and Zoloft during second hospitalization, but found risperdal caused side effects  Was tapered off in the outpatient setting, but still was having side effects, was having \"panic attacks\" and suicidal thoughts  States on third hospitalization, was started on Invega and seems \"much better\" and also on Zoloft 50mg daily  States she feels the best she has ever felt  States she has struggled with social anxiety much of her life, as well as some mild depression  States she has been feeling like everything was a routine and \"just same thing every day\"  States that 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  States work is stressful and not rewarding  States she is planning on quitting  She states she is not sure what her dream job would be    Currently living with parents, has one sister, " "who lives a few blocks away  She had went to [employer]  Graduated in mathematics  Did not fird job first year out, and felt depressed  States she does struggle with social anxiety and can be \"avoidant\" at times  States she feels it comes from low self-esteem  She admits she does not have many friends, and can be avoidant at times  She states that she does want to try and meet more people  She states she does not have many friends outside of her immediate family, has not kept in touch with people from college  She states this is something she would like to work on in the [address] include social anxiety, job stress       As per this writer: Sarmad is a 25year old female who was referred to [facility] by [address] [place] [address] Inpatient unit due to worsening depression and frequent hospitalizations  Today Sarmad stated that her current stressors include; her job and financial stress  Sarmad stated that she is an  at a [employer], and has been working there for the last six years  Sarmad stated that she will be submitting her resignation on Monday, and feels less stress due to this decision  Sarmad stated that she also has some financial stress due to [employer] and \"not making enough at her job\"  Sarmad stated that her current symptoms include; social anxiety \"for a long time\", a low level of depression that has started to worsen over the past few months, panic attacks where she feels as if she cannot breathe that last 45-60 minutes, and delusions that consist of \"thinking people are sending me messages through music\"  Sarmad stated that she has had three inpatient admissions all from this year  Sarmad stated no past history of outpatient care  Sarmad stated that she has supports in her family that consist of her mother and father, who she lives with, and her younger sister who lives a few blocks away   Sarmad stated that she has no history of " "domestic violence or trauma  [facility] stated that her sleep is \"back to normal\" stating that she was only sleeping 4-6 hours when she was experiencing delusions  [facility] stated that she is now eating three meals a day, and is completing ADLs and making time to exercise and do her makeup  [facility] stated that she has a Bachelor's degree in [place]  [employer] reported vaping nicotine throughout the day, and has coffee and some soda daily as her caffeine  [facility] stated that she has no access to weapons, no past suicide attempts, no SI, HI, SIB, or hallucinations  Natachas goals for program are to work on her social anxiety and self-esteem  PHQ-9 at intake is 8  As per Ashok Schmid : \"thinking people are sending me messages through music\", \"I want to work on my self-esteem\"  Strengths: organized, efficient, problem solving     Reason for evaluation and partial hospitalization as an alternative to inpatient hospitalization PHP is medically necessary to prevent hospitalization as outpatient care has been unable to stabilize Ashok Schmid and a greater intensity of treatment is indicated  Milieu therapy to monitor for medication needs, provide wellness tools education and offer opportunity to share and connect to others  Group therapy, case management, psychiatric medication management, family contact and UR as indicated  ELOS 10 treatment days  Previous Psychiatric/psychological treatment/year:    Three inpatient admissions this year      Current Psychiatrist: LESLEY 05/20/23 at [address] [place] [address], [address]  [place] [address], [address] [facility]  [phone]    Therapist  None, will refer to [facility]    Outpatient and/or Partial and Other [employer] Used (CTT, ICM, VNA): none at this time      Problem Assessment:     SOCIAL/VOCATION:  Family Constellation (include parents, relationship with each and pertinent Psych/Medical History):     Family History   Problem Relation Age of Onset   • No Known Problems " Mother    • No Known Problems Father    • Lymphoma Maternal Grandmother    • Diabetes Maternal Grandmother    • Prostate cancer Maternal Grandfather        Mother: good relationship, supportive  Spouse: none   Father: good relationship, supportive   Children: none   Sibling: one sister, lives a few blocks away  Other: none    Who is the person you relate to best her sister  she lives with her mother and father  Legal Guardian (for individuals under 18): n/a  Family Factors impacting discharge planning (for individuals under 18): n/a    Domestic Violence: No past history of domestic violence    Additional Comments related to family/relationships/peer support: limited  School or Work History (strengths/limitations/needs): Resigning from her job of six years on Monday    Her highest grade level achieved was Bachelor's degree in [employer]  Christopher history includes none    Financial status includes not stable    LEISURE ASSESSMENT (Include past and present hobbies/interests and level of involvement (Ex: Group/Club Affiliations): exercise, baking, cooking, makeup  Her primary language is [place]  Preferred language is [place]  Ethnic considerations are none  Religions affiliations and level of involvement none   FUNCTIONAL STATUS: There has been a recent change in the patient's ability to do the following: does not need van service    Level of Assistance Needed/By Whom?: Catrachita Villarreal 42 learns best by  reading, listening, demonstration and picture    SUBSTANCE ABUSE ASSESSMENT: no substance abuse    Do you currently smoke? Yes Offered smoking cessation?  Yes- declined    Substance/Route/Age/Amount/Frequency/Last Use:   Cigarette: vapes nicotine throughout the day  Alcohol: none  Marijuana: none  Caffeine: coffee daily in the morning, occasional diet soda  Other: none     DETOX HISTORY: none    Previous detox/rehab treatment: none    HEALTH ASSESSMENT: no nausea, no vomiting and no referral to PCP needed    Primary "Care Physician  Mavis Matias  [facility] [address] [address]  Dee Sow- [phone]  - [phone]    If None on file providers offered:No  Date of Last Physical: February/March of this year  if not within the last year, one has been recommended:No    NUTRITION SCREENING:  Do you have any food allergies: No   Weight loss or gain of 10 pounds or more in the last 3 months: No  Decrease in appetite and/or food intake: No  Dental issues impacting nutrition: No  Binging or restricting patterns: Yes \"eating the same thing every day\"  Past treatment for an eating disorder: No  Level of nutrition needs: Yes = 1 point; No = 0   1  none (0)- low (1-3) - moderate (4) - severe (5)   Action plan if moderate to severe: Referral to:PCP      LEGAL: No Mental Health Advance Directive or Power of  on file    Risk Assessment:   The following ratings are based on my interview(s) with Sarmad during intake    Risk of Harm to Self:   Demographic risk factors include  and never  or  status  Historical Risk Factors include frequent hospitalizations  Recent Specific Risk Factors include presence of delusions and recent rejection/lack of support    Risk of Harm to Others:   Demographic Risk Factors include none reported  Historical Risk Factors include none reported  Recent Specific Risk Factors include multiple stressors    Access to Weapons:   [facility] has access to the following weapons: none  The following steps have been taken to ensure weapons are properly secured: n/a    Based on the above information, the client presents the following risk of harm to self or others:  low    The following interventions are recommended:   referral to outpatient therapy    Notes regarding this Risk Assessment: Provided crisis phone numbers for appropriate county and on-call number as well as warm lines and peer support hotlines      Review Of Systems:  Constitutional negative   ENT negative " Cardiovascular negative   Respiratory negative   Gastrointestinal negative   Genitourinary negative   Musculoskeletal negative   Integumentary negative   Neurological negative   Endocrine negative   Pain none   Pain Level    0/10   Other Symptoms none, all other systems are negative       Mental status:  Appearance age appropriate, casually dressed   Behavior cooperative, mildly anxious, still at times guarded, good eye contact   Speech normal rate, normal volume, normal pitch   Mood improved, still at times anxious   Affect normal range and intensity, appropriate   Thought Processes organized, goal directed   Associations intact associations   Thought Content no overt delusions   Perceptual Disturbances: no auditory hallucinations, no visual hallucinations   Abnormal Thoughts  Risk Potential Suicidal ideation - None  Homicidal ideation - None  Potential for aggression - No   Orientation oriented to person, place, time/date and situation   Memory recent and remote memory grossly intact   Consciousness alert and awake   Attention Span Concentration Span attention span and concentration are age appropriate   Intellect appears to be of average intelligence   Insight intact   Judgement intact   Muscle Strength and  Gait normal muscle strength and normal muscle tone, normal gait and normal balance   Motor Activity no abnormal movements   Language no difficulty naming common objects, no difficulty repeating a phrase, no difficulty writing a sentence   Fund of Knowledge adequate knowledge of current events  adequate fund of knowledge regarding past history  adequate fund of knowledge regarding vocabulary        DSM:   1  Mood disorder with psychosis ([facility] )        2  JAIME (generalized anxiety disorder)            Plan: Admit to PHP  Group therapy, case management, medication management, UR and family contact as indicated    ELOS 10 treatment days  Refer to OP psychiatry and therapy    Anticipated aftercare plan: discharge to outpatient care

## 2023-04-28 NOTE — PSYCH
Subjective:     Patient ID: Jumana Galloway is a 25 y o  female  [facility] Clinical Progress Notes      Specialized Services Documentation  Therapist must complete separate progress note for each specific clinical activity in which the individual participated during the day  Group Psychotherapy Life Skills ([id number])  Jumana Galloway actively engaged in group focused on core beliefs which was facilitated virtually in a private office using HIPAA Compliant and Approved [employer] Teams  [facility] consented to the use of tele-video modality of treatment and was virtually present for group psychotherapy today  Group members watched a short video, Healing Your Negative Core Beliefs  Group members learned and share about their core beliefs  The group discussed how core beliefs influence their thoughts and behaviors  During the activity the group learned about cognitive reframing and explored ways to reshape their negative core beliefs into positive beliefs  Group members wrote one of their negative core beliefs and had to provide three pieces of evidence that prove the negative core belief untrue  During the exercise, Natachas negative thoughts were challenged by three pieces of evidence , one of them being “ I speak up when I have a question  ” Discussed thought records and how to use them  Group members were asked to challenge one negative thought per day  [facility] continues to make progress towards goals through verbal participation in group; to accomplish long term goals continue to utilize skills learned in programming  Continue with psychotherapy to educate and encourage use of wellness tools   Tx Plan Objective: 1 1,1 2 Therapist: Caitlin Mead

## 2023-04-28 NOTE — PSYCH
Virtual Regular Visit    Verification of patient location:    Patient is located at Home in the following state in which I hold an active license [place]      Assessment/Plan:    Problem List Items Addressed This Visit        Other    Mood disorder with psychosis ([address] ) - Primary    JAIME (generalized anxiety disorder)       Goals addressed in session: Goal 1          Reason for visit is VIRTUAL PHP GROUP DUE TO PREFERRED VIRTUAL CARE  Encounter provider [facility] PARTIAL PSYCH PROGRAM    Provider located at [address] [place] [address]  [address] [place] [address]      Recent Visits  No visits were found meeting these conditions  Showing recent visits within past 7 days and meeting all other requirements  Today's Visits  Date Type Provider Dept   04/28/23 Office Visit [facility] PARTIAL PSYCH GROUP THERAPY [facility]   Showing today's visits and meeting all other requirements  Future Appointments  No visits were found meeting these conditions  Showing future appointments within next 150 days and meeting all other requirements       The patient was identified by name and date of birth  Melonie Tesfaye was informed that this is a telemedicine visit and that the visit is being conducted [employer]  She agrees to proceed     My office door was closed  No one else was in the room  She acknowledged consent and understanding of privacy and security of the video platform  The patient has agreed to participate and understands they can discontinue the visit at any time  Patient is aware this is a billable service  Jailyn Estrada is a 25 y o  female   HPI     Past Medical History:   Diagnosis Date   • Anxiety    • Depression    • Panic attack    • Psychiatric disorder        No past surgical history on file      Current Outpatient Medications   Medication Sig Dispense Refill   • [START ON 5/15/2023] cholecalciferol (VITAMIN D3) 1,000 units tablet Take 1 tablet (1,000 Units total) by mouth daily Do not start before May 15, 2023  30 tablet 0   • cyanocobalamin (VITAMIN B-12) 500 MCG tablet Take 1 tablet (500 mcg total) by mouth daily 30 tablet 0   • ergocalciferol (VITAMIN D2) 50,000 units Take 1 capsule (50,000 Units total) by mouth once a week for 10 doses 10 capsule 0   • melatonin 3 mg Take 2 tablets (6 mg total) by mouth daily at bedtime for 30 doses 60 tablet 0   • paliperidone (INVEGA) 3 mg 24 hr tablet Take 1 tablet (3 mg total) by mouth daily Do not start before April 26, 2023  30 tablet 0   • sertraline (ZOLOFT) 50 mg tablet Take 1 tablet (50 mg total) by mouth daily Do not start before April 26, 2023  30 tablet 0     No current facility-administered medications for this visit  No Known Allergies    Review of Systems    Video Exam    There were no vitals filed for this visit  Physical Exam     Visit Time  I have spent FOUR GROUP HOURS + CASE MANAGEMENT minutes with patient today in which greater than 50% of the time was spent in counseling/coordination of care regarding PHP- see notes

## 2023-05-01 ENCOUNTER — OFFICE VISIT (OUTPATIENT)
Dept: PSYCHOLOGY | Facility: CLINIC | Age: 28
End: 2023-05-01

## 2023-05-01 DIAGNOSIS — F41.1 GAD (GENERALIZED ANXIETY DISORDER): ICD-10-CM

## 2023-05-01 DIAGNOSIS — F39 MOOD DISORDER WITH PSYCHOSIS (HCC): Primary | ICD-10-CM

## 2023-05-01 NOTE — PSYCH
Visit Time    Visit Start Time: 2126  Visit Stop Time: 1330  Total Visit Duration: 60 minutes    Subjective:     Patient ID: Elizabeth Jones is a 25 y o  female  [facility] Clinical Progress Notes      Specialized Services Documentation  Therapist must complete separate progress note for each specific clinical activity in which the individual participated during the day  Group Psychotherapy - Window of Tolerance   This group was facilitated virtually in a private office using HIPAA compliant and approved [employer] teams  Elizabeth Jones consented to the use of tele-video modality of treatmentToday group members were focused on understanding windows of tolerance  As a group, members examined their fight flight, freeze, and face reactions to stressors  Each member also evaluated their hyperarousal vs  hypoarousal responses  This group focused on the process of dysregulation and methods to begin regulating emotions to stay in their window of tolerance  Group members designed their own windows of tolerance, outlining what stressors fit into their hyper/hypo-aroused states  Members were encouraged to join in on the group discussion, take notes, and set intentions to practice the management and regulation techniques  Elizabeth Jones attended group on windows of tolerance  Elizabeth Jnoes made good efforts towards progress goals which were displayed through participation and engagement in topic  Sarmad was observed to take notes when instructed to and engaged in discussion  Tx Plan Objective:1 1,1 2,1 4  Godfrey Nguyễn, [place]     Education Therapy   [id number] Elizabeth Jones actively shared in morning assessment and goal review  Presented as Receptive related to readiness to learn  Elizabeth Jones did complete goal from last treatment day identifying gaining support  did not present with any barriers to learning       [address] engaged throughout the treatment day  Was engaged in learning related to Illness, Medication, Aftercare, and Wellness Tools  Staff utilized Verbal, Written, A/V, and Demonstration teaching methods  Brando Issa shared area of learning and set a goal for outside of program to make important phone calls        Tx Plan Objective: 1 1,1 2,1 4, Therapist: Godfrey Cox LSW

## 2023-05-01 NOTE — PSYCH
Subjective:     Patient ID: Jose Dang is a 25 y o  female  [facility] Clinical Progress Notes      Specialized Services Documentation  Therapist must complete separate progress note for each specific clinical activity in which the individual participated during the day  Group Psychotherapy Life Skills ([id number]) Jose Dang minimally engaged in an open processing group which was facilitated virtually in a private office using HIPAA Compliant and Approved [employer] Teams  Sarmad consented to the use of tele-video modality of treatment and was virtually present for group psychotherapy today  The group discussed where their shame and guilt came from, playing the comparison game, and their personal identity   Sarmad did not participate in the conversations related to the topics  Sarmad made little progress towards goals through verbal participation in group; to accomplish long term goals continue to utilize skills learned in programming  Continue with psychotherapy to educate and encourage use of wellness tools   Tx Plan Objective: 1 1,1 2 Therapist: Dexter Szymanski ; Co-facilitated with JOSESITO Siddiqui

## 2023-05-01 NOTE — PSYCH
Virtual Regular Visit    Verification of patient location:    Patient is located at Home in the following state in which I hold an active license [place]      Assessment/Plan:    Problem List Items Addressed This Visit        Other    Mood disorder with psychosis ([address] ) - Primary    JAIME (generalized anxiety disorder)       Goals addressed in session:           Reason for visit is PHP VIRTUAL GROUP DUE TO virtual preference of care      Encounter provider Marlon Aguilar    Provider located at [address] [place] [phone]      Recent Visits  Date Type Provider Dept   04/28/23 Office Visit [address] PARTIAL PSYCH GROUP THERAPY [facility]   Showing recent visits within past 7 days and meeting all other requirements  Future Appointments  No visits were found meeting these conditions  Showing future appointments within next 150 days and meeting all other requirements       The patient was identified by name and date of birth  Pepegh Essex was informed that this is a telemedicine visit and that the visit is being conducted [employer]  She agrees to proceed     My office door was closed  No one else was in the room  She acknowledged consent and understanding of privacy and security of the video platform  The patient has agreed to participate and understands they can discontinue the visit at any time  Patient is aware this is a billable service  Arturo Tavera is a 25 y o  female    HPI     Past Medical History:   Diagnosis Date    Anxiety     Depression     Panic attack     Psychiatric disorder        No past surgical history on file      Current Outpatient Medications   Medication Sig Dispense Refill    [START ON 5/15/2023] cholecalciferol (VITAMIN D3) 1,000 units tablet Take 1 tablet (1,000 Units total) by mouth daily Do not start before May 15, 2023  30 tablet 0    cyanocobalamin (VITAMIN B-12) 500 MCG tablet Take 1 tablet (500 mcg total) by mouth daily 30 tablet 0    ergocalciferol (VITAMIN D2) 50,000 units Take 1 capsule (50,000 Units total) by mouth once a week for 10 doses 10 capsule 0    melatonin 3 mg Take 2 tablets (6 mg total) by mouth daily at bedtime for 30 doses 60 tablet 0    paliperidone (INVEGA) 3 mg 24 hr tablet Take 1 tablet (3 mg total) by mouth daily Do not start before April 26, 2023  30 tablet 0    sertraline (ZOLOFT) 50 mg tablet Take 1 tablet (50 mg total) by mouth daily Do not start before April 26, 2023  30 tablet 0     No current facility-administered medications for this visit  No Known Allergies    Review of Systems    Video Exam    There were no vitals filed for this visit      Physical Exam     I spent FOUR GROUP HOURS PLUS CASE MANAGEMENT minutes with patient today in which greater than 50% of time was spent in counseling/coordination of care regarding PHP - SEE NOTES

## 2023-05-01 NOTE — PSYCH
"Subjective:     Patient ID: Héctor Long is a 25 y o  female  [facility] Clinical Progress Notes      Specialized Services Documentation  Therapist must complete separate progress note for each specific clinical activity in which the individual participated during the day  Allied Therapy   Group was facilitated virtually in a private office using HIPAA compliant and approved Microsoft Teams  Héctor Long consented to the use of tele-video modality of treatment and was virtually present for group allied therapy  [id number] Héctor Long did not share in [facility] group focused on understanding the difference between guilt and shame  Héctor Long participated in song discussion on \"Older\" by Chalo Paulino relating to regrets and guilt  Group engaged in discussion on understanding healthy vs unhealthy guilt and shame  Group watched ELVA talk by Amanda Barnett on guilt and shame  Héctor Long didn't share something they learned or took away from the group  Héctor Long did not share what she would work on to practice coping with guilt or shame  Minimal progress noted toward treatment goals  Continue AT to develop understanding and practice of differentiating guilt vs shame  Tx Plan Objective: 1 1,1 2,1 4, Therapist:  JOSESITO Perry    Case Management Note    JOSESITO Perry    Current suicide risk : Low     [id number] this writer met with Héctor Long for case management  [facility] stated that groups were very informative and that she was taking notes throughout them  Herb[place]well stated that she submitted her letter of resignation today and felt good about it  This writer reviewed treatment plan with Sarmad, who was agreeable to this writer signing on her behalf  This writer notified [facility] of medication check on 05/05/23 at 0830  No other concerns at this time  Next scheduled CM for 05/03/23    I,Lizette Hicks,am physically unable to provide a signature; however, I understand the " nature of and am in agreement with the documentation presented to me via TEAMS  I have received a copy through My Chart and/or the [address] [place] [address],[address] Postal Service  I freely give verbal consent  Name of Document (s): treatment plan consent  Witness to verbal consent: JOSESITO Sequeira  Witness to verbal consent: MONI Kohler      Medications changes/added/denied? No    Treatment session number: 2    Individual Case Management Visit provided today? Yes     [facility] follow up physician's orders: none at this time

## 2023-05-02 ENCOUNTER — OFFICE VISIT (OUTPATIENT)
Dept: PSYCHOLOGY | Facility: CLINIC | Age: 28
End: 2023-05-02

## 2023-05-02 DIAGNOSIS — F39 MOOD DISORDER WITH PSYCHOSIS (HCC): Primary | ICD-10-CM

## 2023-05-02 DIAGNOSIS — F41.1 GAD (GENERALIZED ANXIETY DISORDER): ICD-10-CM

## 2023-05-02 NOTE — PSYCH
Subjective:     Patient ID: Iona Hudson is a 25 y o  female  [facility] Clinical Progress Notes      Specialized Services Documentation  Therapist must complete separate progress note for each specific clinical activity in which the individual participated during the day  Allied Therapy   Group was facilitated virtually in a private office using HIPAA compliant and approved [employer] Teams  Iona Hudson consented to the use of tele-video modality of treatment and was virtually present for group allied therapy  [id number] Iona Hudson minimally shared in [facility] group focused on SMART goals  Sarmad was observed to be engaged in a therapist led discussion to the song [employer] and talked about taking back control of their lives  Sarmad engaged in discussion on what SMART goals are, why they are important, and how to set them  Group was given time to practice writing a SMART goal and listed going to her doctor's appointment, getting gas and groceries as one they were hoping to accomplish  Some effort noted toward treatment goal  Continue AT to encourage development and practice of writing and understanding of SMART goals  Tx Plan Objective: 1 1,1 2,1 4, Therapist:  JOSESITO Loya    Case Management Note    JOSESITO Loya    Current suicide risk : Low     No CM scheduled for today  Additionally, no CM requested  Next CM scheduled for 05/03/23  Medications changes/added/denied? No    Treatment session number: 3    Individual Case Management Visit provided today? No    [facility] follow up physician's orders: none at this time

## 2023-05-02 NOTE — PSYCH
Visit Time    Visit Start Time: 0930  Visit Stop Time: 2500  Total Visit Duration: 60 minutes    Subjective:     Patient ID: Raffaele Browning is a 25 y o  female  [facility] Clinical Progress Notes      Specialized Services Documentation  Therapist must complete separate progress note for each specific clinical activity in which the individual participated during the day  Group Psychotherapy - Stress Management   This group was facilitated virtually in a private office using HIPAA compliant and approved [employer] teams  Raffaele Browning attended group on stress management  Today group members focused on understanding their experience/relationship with stress  Members evaluated their stressors and identified the types of stress they experience  The goal of today's group was for members to openly discuss their stressors and identify their coping methods  As well as evaluate a variety of new coping strategies and techniques they could utilize when stressed   facilitated discussion on:    What are your current stressors? Symptoms?  What coping skills do you currently use? Members also participated in group through completing the stress exploration worksheet  This writer encouraged members to openly share and integrate the coping skills within their daily routine  Raffaele Browning made good effort towards progress goals which were displayed through participation, notetaking, and engagement in topic  Raffaele Browning shared that she struggles with seeking new supports as a stressor  TX Plan Objective:1 1,1 2,1 4   Godfrey Canela facilitate Yamila Santoro RN    Education Therapy   [id number] Raffaele Browning actively shared in morning assessment and goal review  Presented as Receptive related to readiness to learn  Raffaele Browning did complete goal from last treatment day identifying gaining support  did not present with any barriers to learning       [address] engaged throughout the treatment day  Was engaged in learning related to Illness, Medication, Aftercare, and Wellness Tools  Staff utilized Verbal, Written, A/V, and Demonstration teaching methods  Iona Hudson shared area of learning and set a goal for outside of program to run some errands         Tx Plan Objective: 1 1,1 2,1 4, Therapist: Godfrey Squires RN

## 2023-05-02 NOTE — PSYCH
Subjective:     Patient ID: Debo Veloz is a 25 y o  female  [facility] Clinical Progress Notes      Specialized Services Documentation  Therapist must complete separate progress note for each specific clinical activity in which the individual participated during the day  Group Psychotherapy  This group was facilitated virtually in a private office using HIPAA Compliant and Approved [employer] Teams  Debo Veloz consented to the use of tele-video modality of treatment  ([id number]) Deob Veloz attended group on Part II of Wellness Recovery Action Plan  Today, members focused on  WRAP's following specificsections and encouraged to fill in coping tools from their toolbox for planned responses:   · Daily Plan   · Identification of triggers and stressors  · Early warning signs  · When things are breaking down and or getting worse  · Crisis Plan  · Post crisis plan    Group members shared pieces of information from these sections and identified their importance  Writer encouraged the members of group to continue utilizing the packet to develop plans inside and outside of program  Some effort towards progress of goals which were displayed through participation and engagement in topic    Treatment Plan Objectives: 1 1, 1 2  Therapist: Geovany ROSAN RN     Co-led Ariel ALVARADO

## 2023-05-02 NOTE — PSYCH
Virtual Regular Visit    Verification of patient location:    Patient is located at Home in the following state in which I hold an active license [place]      Assessment/Plan:    Problem List Items Addressed This Visit        Other    Mood disorder with psychosis ([address] ) - Primary    JAIME (generalized anxiety disorder)       Goals addressed in session:           Reason for visit is PHP VIRTUAL GROUP DUE TO virtual preference of care      Encounter provider Marlon Aguilar    Provider located at [address] [place] [phone]      Recent Visits  Date Type Provider Dept   05/01/23 Office Visit [facility] PARTIAL PSYCH GROUP THERAPY [facility]   04/28/23 Office Visit [facility] PARTIAL PSYCH GROUP TH[facility]APY [facility]   Showing recent visits within past 7 days and meeting all other requirements  Today's Visits  Date Type Provider Dept   05/02/23 Office Visit [facility] PARTIAL PSYCH GROUP THERAPY [facility]   Showing today's visits and meeting all other requirements  Future Appointments  No visits were found meeting these conditions  Showing future appointments within next 150 days and meeting all other requirements       The patient was identified by name and date of birth  Annalee Padilla was informed that this is a telemedicine visit and that the visit is being conducted [employer]  She agrees to proceed     My office door was closed  No one else was in the room  She acknowledged consent and understanding of privacy and security of the video platform  The patient has agreed to participate and understands they can discontinue the visit at any time  Patient is aware this is a billable service  Jose Andres is a 25 y o  female         HPI     Past Medical History:   Diagnosis Date    Anxiety     Depression     Panic attack     Psychiatric disorder No past surgical history on file  Current Outpatient Medications   Medication Sig Dispense Refill    [START ON 5/15/2023] cholecalciferol (VITAMIN D3) 1,000 units tablet Take 1 tablet (1,000 Units total) by mouth daily Do not start before May 15, 2023  30 tablet 0    cyanocobalamin (VITAMIN B-12) 500 MCG tablet Take 1 tablet (500 mcg total) by mouth daily 30 tablet 0    ergocalciferol (VITAMIN D2) 50,000 units Take 1 capsule (50,000 Units total) by mouth once a week for 10 doses 10 capsule 0    melatonin 3 mg Take 2 tablets (6 mg total) by mouth daily at bedtime for 30 doses 60 tablet 0    paliperidone (INVEGA) 3 mg 24 hr tablet Take 1 tablet (3 mg total) by mouth daily Do not start before April 26, 2023  30 tablet 0    sertraline (ZOLOFT) 50 mg tablet Take 1 tablet (50 mg total) by mouth daily Do not start before April 26, 2023  30 tablet 0     No current facility-administered medications for this visit  No Known Allergies    Review of Systems    Video Exam    There were no vitals filed for this visit      Physical Exam     I spent FOUR GROUP HOURS PLUS CASE MANAGEMENT minutes with patient today in which greater than 50% of time was spent in counseling/coordination of care regarding PHP - SEE NOTES

## 2023-05-03 ENCOUNTER — OFFICE VISIT (OUTPATIENT)
Dept: PSYCHOLOGY | Facility: CLINIC | Age: 28
End: 2023-05-03

## 2023-05-03 DIAGNOSIS — F41.1 GAD (GENERALIZED ANXIETY DISORDER): ICD-10-CM

## 2023-05-03 DIAGNOSIS — F39 MOOD DISORDER WITH PSYCHOSIS (HCC): Primary | ICD-10-CM

## 2023-05-03 NOTE — PSYCH
Subjective:     Patient ID: Eddy Rider is a 25 y o  female  [facility] Clinical Progress Notes      Specialized Services Documentation  Therapist must complete separate progress note for each specific clinical activity in which the individual participated during the day  Group Psychotherapy Life Skills (0119-0157) Eddy Rider actively engaged in group focused on values based behaviors which was facilitated virtually in a private office using HIPAA Compliant and Approved [employer] Teams  Sarmad consented to the use of tele-video modality of treatment and was virtually present for group psychotherapy today  During the activity group members thought about each of their top three values and created a goal and action steps to take towards achieving that value-based behavior  Sarmad stated that one of their top values is having a good relationship with the people that she loves and stated their goal and action plan towards achieving that value-based behavior  We discussed personal situations where group members wanted to act and react with their values and not their emotions  Group members discussed one of their values and their intentions to respond to a situation  Group members discussed the steps they would take to turn their intentions into action  Sarmad continues to make progress towards goals through verbal participation in group; to accomplish long term goals continue to utilize skills learned in programming  Continue with psychotherapy to educate and encourage use of wellness tools  Tx Plan Objective: 1 1,1 2 Therapist: Pebbles Pelaez; co-facilitated with Ginger Mercado RN    Education Therapy   [id number] Eddy Rider actively shared in morning assessment and goal review  Presented as Receptive related to readiness to learn  Eddy Rider did complete goal from last treatment day identifying gaining responsibility   did not present with any barriers to learning  [address] engaged throughout the treatment day  Was engaged in learning related to Illness, Medication, Aftercare, and Wellness Tools  Staff utilized Verbal, Written, A/V, and Demonstration teaching methods  Raleigh Essex shared area of learning and set a goal for outside of program to start reading her new book        Tx Plan Objective: 1 1,1 2,1 4 Therapist:  Karson Goodwin, MATHEW, MONI

## 2023-05-03 NOTE — PSYCH
Subjective:     Patient ID: Miguel Angel Colvin is a 25 y o  female  [facility] Clinical Progress Notes      Specialized Services Documentation  Therapist must complete separate progress note for each specific clinical activity in which the individual participated during the day  Group Psychotherapy  This group was facilitated virtually in a private office using HIPAA Compliant and Approved [employer] Teams  Miguel Angel Colvin consented to the use of tele-video modality of treatment  ([id number]) Miguel Angel Colvin actively shared in psychotherapy group exploring the benefits of exercise to support mental wellness  Group explored healthy exercises, barriers to exercise, strategies to overcome barriers, and Qigong exercise for beginners  Miguel Angel Colvin identified making excuses as a barrier and schedule exercise and have an accountability partner as a strategy to overcome  Some progress toward goal noted  Continue psychotherapy to encourage self-awareness and home practice of skills to support wellness     Treatment Plan Objective: 1 1, 1 2   Therapist: Malinda Meigs BSN RN  and Jackelin Kwok MT-BC

## 2023-05-03 NOTE — PSYCH
Subjective:     Patient ID: Mukesh Jo is a 25 y o  female  [facility] Clinical Progress Notes      Specialized Services Documentation  Therapist must complete separate progress note for each specific clinical activity in which the individual participated during the day  Allied Therapy   Group was facilitated virtually in a private office using HIPAA Compliant and Approved [employer] Teams  Mukesh Jo consented to the use of tele-video modality of treatment and was virtually present for group allied therapy today  [id number] Mukesh Jo attentively listened to Ileana share her life story as she co-led this session  Group encouraged power of learning about self, accepting illness and personal responsibility in recovery  Community resources reviewed in addition to personal resources like the affirmations  Some progress toward goals noted  Continue psychotherapy to encourage self -awareness and healthy engagement of supports  Tx Plan Objective: 1 1,1 2,1 4, Therapist:  JOSESITO Abraham    Case Management Note    JOSESITO Abraham    Current suicide risk : Low     [id number] this writer met with uMkesh Jo for case management  [facility] stated that she feels she is learning a lot from program and stated that she feels her anxiety has decreased  [place] stated that she has less racing thoughts throughout the day  [place] stated that she would like a group focused on medications  This writer notified [facility] of medication check scheduled for 05/05/23 at 0830, as well as CM scheduled for same day  No other concerns at this time  Medications changes/added/denied? No    Treatment session number: 4    Individual Case Management Visit provided today? Yes     [facility]ations follow up physician's orders: none at this time

## 2023-05-03 NOTE — PSYCH
Virtual Regular Visit    Verification of patient location:    Patient is located at Home in the following state in which I hold an active license [place]      Assessment/Plan:    Problem List Items Addressed This Visit        Other    Mood disorder with psychosis ([address] ) - Primary    JAIME (generalized anxiety disorder)       Goals addressed in session:           Reason for visit is PHP VIRTUAL GROUP DUE TO VIRTUAL PREFERENCE      Encounter provider [facility] PARTIAL [address]    Provider located at [address] [place] [phone]      Recent Visits  Date Type Provider Dept   05/02/23 Office Visit [facility] PARTIAL PSYCH GROUP THERAPY [facility]   05/01/23 Office Visit [facility] PARTIAL PSYCH GROUP TH[facility]AP[facility]   04/28/23 Office Visit [facility] PARTIAL PSYCH GROUP TH[facility]AP[facility]   Showing recent visits within past 7 days and meeting all other requirements  Today's Visits  Date Type Provider Dept   05/03/23 Office Visit [facility] PARTIAL PSYCH GROUP THERAPY [facility]   Showing today's visits and meeting all other requirements  Future Appointments  No visits were found meeting these conditions  Showing future appointments within next 150 days and meeting all other requirements       The patient was identified by name and date of birth  Debo Veloz was informed that this is a telemedicine visit and that the visit is being conducted [employer]  She agrees to proceed     My office door was closed  No one else was in the room  She acknowledged consent and understanding of privacy and security of the video platform  The patient has agreed to participate and understands they can discontinue the visit at any time  Patient is aware this is a billable service  Felipe Martinez is a 25 y o  female         HPI     Past Medical History:   Diagnosis Date    Anxiety  Depression     Panic attack     Psychiatric disorder        No past surgical history on file  Current Outpatient Medications   Medication Sig Dispense Refill    [START ON 5/15/2023] cholecalciferol (VITAMIN D3) 1,000 units tablet Take 1 tablet (1,000 Units total) by mouth daily Do not start before May 15, 2023  30 tablet 0    cyanocobalamin (VITAMIN B-12) 500 MCG tablet Take 1 tablet (500 mcg total) by mouth daily 30 tablet 0    ergocalciferol (VITAMIN D2) 50,000 units Take 1 capsule (50,000 Units total) by mouth once a week for 10 doses 10 capsule 0    melatonin 3 mg Take 2 tablets (6 mg total) by mouth daily at bedtime for 30 doses 60 tablet 0    paliperidone (INVEGA) 3 mg 24 hr tablet Take 1 tablet (3 mg total) by mouth daily Do not start before April 26, 2023  30 tablet 0    sertraline (ZOLOFT) 50 mg tablet Take 1 tablet (50 mg total) by mouth daily Do not start before April 26, 2023  30 tablet 0     No current facility-administered medications for this visit  No Known Allergies    Review of Systems    Video Exam    There were no vitals filed for this visit  Physical Exam     I spent FOUR GROUP HOURS PLUS CASE MANAGEMENT minutes with patient today in which greater than 50% of the time was spent in counseling/coordination of care regarding PHP - SEE NOTES

## 2023-05-04 ENCOUNTER — OFFICE VISIT (OUTPATIENT)
Dept: PSYCHOLOGY | Facility: CLINIC | Age: 28
End: 2023-05-04

## 2023-05-04 DIAGNOSIS — F41.1 GAD (GENERALIZED ANXIETY DISORDER): ICD-10-CM

## 2023-05-04 DIAGNOSIS — F39 MOOD DISORDER WITH PSYCHOSIS (HCC): Primary | ICD-10-CM

## 2023-05-04 NOTE — PSYCH
Virtual Regular Visit    Verification of patient location:    Patient is located at Home in the following state in which I hold an active license [place]      Assessment/Plan:    Problem List Items Addressed This Visit        Other    Mood disorder with psychosis ([address] ) - Primary    JAIME (generalized anxiety disorder)       Goals addressed in session:           Reason for visit is PHP VIRTUAL GROUP DUE TO virtual preference of care      Encounter provider Marlon Aguilar    Provider located at [address] [place] [address] [address] [place] [phone]      Recent Visits  Date Type Provider Dept   05/03/23 Office Visit [facility] PARTIAL PSYCH GROUP THERAPY [facility]   05/02/23 Office Visit [facility] PARTIAL PSYCH GROUP TH[facility]AP[facility]   05/01/23 Office Visit [facility] PARTIAL PSYCH GROUP [facility]AP[facility]   04/28/23 Office Visit [facility] PARTIAL PSYCH GROUP TH[facility]AP[facility]   Showing recent visits within past 7 days and meeting all other requirements  Today's Visits  Date Type Provider Dept   05/04/23 Office Visit [facility] PARTIAL PSYCH GROUP THERAPY [facility]   Showing today's visits and meeting all other requirements  Future Appointments  No visits were found meeting these conditions  Showing future appointments within next 150 days and meeting all other requirements       The patient was identified by name and date of birth  Amanda Ernandez was informed that this is a telemedicine visit and that the visit is being conducted [employer]  She agrees to proceed     My office door was closed  No one else was in the room  She acknowledged consent and understanding of privacy and security of the video platform  The patient has agreed to participate and understands they can discontinue the visit at any time  Patient is aware this is a billable service       Subjective  Amanda Ernandez is a 25 y o  female   HPI     Past Medical History:   Diagnosis Date    Anxiety     Depression     Panic attack     Psychiatric disorder        No past surgical history on file  Current Outpatient Medications   Medication Sig Dispense Refill    [START ON 5/15/2023] cholecalciferol (VITAMIN D3) 1,000 units tablet Take 1 tablet (1,000 Units total) by mouth daily Do not start before May 15, 2023  30 tablet 0    cyanocobalamin (VITAMIN B-12) 500 MCG tablet Take 1 tablet (500 mcg total) by mouth daily 30 tablet 0    ergocalciferol (VITAMIN D2) 50,000 units Take 1 capsule (50,000 Units total) by mouth once a week for 10 doses 10 capsule 0    melatonin 3 mg Take 2 tablets (6 mg total) by mouth daily at bedtime for 30 doses 60 tablet 0    paliperidone (INVEGA) 3 mg 24 hr tablet Take 1 tablet (3 mg total) by mouth daily Do not start before April 26, 2023  30 tablet 0    sertraline (ZOLOFT) 50 mg tablet Take 1 tablet (50 mg total) by mouth daily Do not start before April 26, 2023  30 tablet 0     No current facility-administered medications for this visit  No Known Allergies    Review of Systems    Video Exam    There were no vitals filed for this visit      Physical Exam     I spent FOUR GROUP HOURS PLUS CASE MANAGEMENT minutes with patient today in which greater than 50% of time was spent in counseling/coordination of care regarding PHP - SEE NOTES

## 2023-05-04 NOTE — PSYCH
"  Subjective:     Patient ID: Izabela Mead is a 25 y o  female  [facility] Clinical Progress Notes      Specialized Services Documentation  Therapist must complete separate progress note for each specific clinical activity in which the individual participated during the day  Group Psychotherapy Life Skills  ([id number])  Izabela Mead actively engaged in group focused on developing self-compassion which was facilitated virtually in a private office using HIPAA Compliant and Approved [employer] Teams  [facility] consented to the use of tele-video modality of treatment and was virtually present for group psychotherapy today  Group members discussed what is self compassion  Group members practiced self-compassion by completing self-compassion exercises drawn from \"The Self-Compassion Deck\"  [facility] discussed how they could bring self-compassion into their lives by \"looking at things from another perspective and thinking about what I would say to someone else  \"During the group we discussed the benefits of self-compassion and how it helps improve well-being, connection to others, increases selflessness, regulates emotions, reduces depression and anxiety  [facility] continues to make progress towards goals through verbal participation in group; to accomplish long term goals continue to utilize skills learned in programming  Continue with psychotherapy to educate and encourage use of wellness tools   Tx Plan Objective: 1 1,1 2 Therapist: Tim Hopkins ; co-facilitated with Terell Eastman RN    "

## 2023-05-04 NOTE — PSYCH
"    Subjective:     Patient ID: Jennifer Foreman is a 25 y o  female  [facility] Clinical Progress Notes      Specialized Services Documentation  Therapist must complete separate progress note for each specific clinical activity in which the individual participated during the day  Group Psychotherapy  This group was facilitated virtually in a private office using HIPAA Compliant and Approved [employer] Teams  Jennifer Foreman consented to the use of tele-video modality of treatment  ([id number]) Jennifer Foreman  attended group on the [address] [place] [address] and Wellness  Group members were educated on the background of the 5 Key Facets of Recovery and Wellness exercise  First, members answered the following questions:   What does wellness mean to you?  My definition of recovery is     Today, I will support myself by    Members then focused the following aspects:   · 5 Key Facets of Recover and [address], Education, Advocacy, Responsibility, Support  · Hope-  · I can educate myself by     · Name some self advocating opportunities  · I take personal responsibility by     · My supporters are    Lastly, the group completed an exercise titled \"Having a tough day\"  The group members were asked to write out a negative statement (trash talk) about themselves, then rephrase it into a positive affirmation  Next they:   Threw out the \"trash talk\" statement   Read aloud the positive affirmation statement  Good  effort towards progress displayed through participation and engagement in topic  Group members were encouraged to use these questions to continue on their journey to wellness      Treatment Plan Objectives: 1 1, 1 2  Therapist: Constance ROSAN RN  and Genoveva Stewart RN       "

## 2023-05-04 NOTE — PSYCH
Visit Time    Visit Start Time: 3909  Visit Stop Time: 1330  Total Visit Duration: 60 minutes    Subjective:     Patient ID: Lazarus Pintos is a 25 y o  female  [facility] Clinical Progress Notes      Specialized Services Documentation  Therapist must complete separate progress note for each specific clinical activity in which the individual participated during the day  Group Psychotherapy - Emotional Intelligence  This group was facilitated virtually in a private office using HIPAA compliant and approved [employer] teams  Lazarus Pintos consented to the use of tele-video modality of treatment  This  began group with a discussion question that each member was encouraged to answer  Once each member had the opportunity to share, we reviewed Emotional intelligence and its different domains  This group focused on accepting and understanding our emotions in order to increase emotional intelligence  Members reviewed typed of emotions and the associated behaviors and were encouraged to share personal experiences or their use of the emotions and behaviors  The  then facilitated a discussion with the intent of reflection on:   What did you first think when you were told to share a difficult experience with another? How did you feel after acknowledging and accepting your emotions? Did you feel more at peace after accepting your emotions generated by your experience? The goal of this discussion was for each member to thoroughly reflect and talk about their emotions in order to practice emotional intelligence with an emphasis on group engagement and related emotions  Following this, this  reviewed additional way to enhance emotional intelligence with group members  Group members were encouraged to review and complete the supplemental materials provided relating to emotion reflection  Lazarus Pintos attended group on emotional intelligence   Lazarus Pintos made god efforts towards progress goals which were displayed through participation and engagement in topic  [facility] wa observed to voluntarily join in on group discussion  Tx Plan Objective: 1 1,1 2,1 4  CHRISTIANO Yuen Co- facilitate Oswald Woods RN    Education Therapy   [id number] Johnathan Russell actively shared in morning assessment and goal review  Presented as Receptive related to readiness to learn  Johnathan Russell did complete goal from last treatment day identifying gaining education  did not present with any barriers to learning  [address] engaged throughout the treatment day  Was engaged in learning related to Illness, Medication, Aftercare, and Wellness Tools  Staff utilized Verbal, Written, A/V, and Demonstration teaching methods  Johnathan Russell shared area of learning and set a goal for outside of program to organize her clothing        Tx Plan Objective: 1 1,1 2,1 4, Therapist: Godfrey Yuen facilitate Oswald Woods RN

## 2023-05-04 NOTE — PSYCH
Subjective:     Patient ID: Td Lee is a 25 y o  female  [facility] Clinical Progress Notes      Specialized Services Documentation  Therapist must complete separate progress note for each specific clinical activity in which the individual participated during the day  Case Management Note    JOSESITO Hamilton    Current suicide risk : Low     No CM scheduled for today  No CM requested  Next scheduled CM for 05/05/23    Medications changes/added/denied? No    Treatment session number: 5    Individual Case Management Visit provided today? No    [facility] follow up physician's orders: none at this time

## 2023-05-05 ENCOUNTER — OFFICE VISIT (OUTPATIENT)
Dept: PSYCHOLOGY | Facility: CLINIC | Age: 28
End: 2023-05-05

## 2023-05-05 ENCOUNTER — TELEMEDICINE (OUTPATIENT)
Dept: PSYCHIATRY | Facility: CLINIC | Age: 28
End: 2023-05-05

## 2023-05-05 DIAGNOSIS — F39 MOOD DISORDER WITH PSYCHOSIS (HCC): Primary | ICD-10-CM

## 2023-05-05 DIAGNOSIS — F41.1 GAD (GENERALIZED ANXIETY DISORDER): ICD-10-CM

## 2023-05-05 NOTE — PSYCH
"Subjective:     Patient ID: Jose Luis Pappas is a 25 y o  female  [facility] Clinical Progress Notes      Specialized Services Documentation  Therapist must complete separate progress note for each specific clinical activity in which the individual participated during the day  Allied Therapy   Group was facilitated virtually in a private office using HIPAA compliant and approved [employer] Teams  Jose Luis Pappas consented to the use of tele-video modality of treatment and was virtually present for group allied therapy  [id number] Jose Luis Pappas actively participated in [facility] group focused on self-soothe techniques  Group engaged in conversation about what self-soothe looks like, when to use it, and how it helps with anxiety or depression symptoms  Group took time to create a self-soothe \"coping playlist\"  This playlist included songs that Sarmad chose in four categories; emotions, strong sensations, diversions and discharge  Sarmad took time to [employer] and chose [employer]" by Le Delarosa as a song for the prompt \"a song for when you feel angry\"  Group discussed other items to put into a self-soothe bag with their [address] chose pictures of her family as an item for their bag  Some progress noted toward treatment goals  Continue with AT to further practice and implementation of self-soothe through the senses  Tx Plan Objective: 1 1,1 2,1 4, Therapist:  JOSESITO Douglas    Education Therapy   [id number] Jose Luis Pappas actively shared in morning assessment and goal review  Presented as Receptive related to readiness to learn  Jose Luis Pappas did complete goal from last treatment day identifying gaining responsibility  did not present with any barriers to learning  [address] engaged throughout the treatment day  Was engaged in learning related to Illness, Medication, Aftercare and Wellness Tools   Staff utilized Verbal, Written, A/V and Demonstration teaching " methods  [place] shared area of learning and set a goal for outside of program to enjoy time with family  Tx Plan Objective: 1 1,1 2,1 4, Therapist:  JOSESITO Hamilton    Case Management Note    JOSESITO Hamilton    Current suicide risk : Low     [id number] this writer met with Td Lee for case management  [facility] stated that she has been using positive affirmations and made an affirmation the background of her phone  [facility] stated that she has been re-framing her thoughts and this writer taught the [employer]  [facility] stated that she had her medication questions answered in the medication check this morning  [facility] stated that she feels ready for discharge [facility] on Monday 05[facility]23  [facility] stated that she feels much more comfortable talking in groups  [facility] stated that she has a medication appointment with Gi on 05/23/23 at [facility] with Tova Sinha, and will schedule a therapy appointment after that appointment  This writer sent relapse prevention plan via e-mail  Medications changes/added/denied? No see Severiano Peaks note    Treatment session number: 6    Individual Case Management Visit provided today? Yes     [facility] follow up physician's orders: see Severiano Peaks note

## 2023-05-05 NOTE — PSYCH
Virtual Regular Visit    Verification of patient location:    Patient is located at Home in the following state in which I hold an active license [place]      Assessment/Plan:    Problem List Items Addressed This Visit        Other    Mood disorder with psychosis ([address] ) - Primary    JAIME (generalized anxiety disorder)       Goals addressed in session:           Reason for visit is PHP VIRTUAL GROUP DUE TO VIRTUAL PREFERENCE      Encounter provider [facility] PARTIAL [address]    Provider located at [address] [place] [id number]      Recent Visits  Date Type Provider Dept   05/04/23 Office Visit [facility] PARTIAL PSYCH GROUP THERAPY [facility]   05/03/23 Office Visit [facility] PART[facility] PSYCH GROUP THERAPY [facility]   05/02/23 Office Visit [facility] PART[facility] PSYCH GROUP THERAPY [facility]   05/01/23 Office Visit [facility] PART[facility] PSYCH GROUP THERAPY [facility]   04/28/23 Office Visit [facility] PARTIAL PSYCH GROUP THERAPY [facility]   Showing recent visits within past 7 days and meeting all other requirements  Future Appointments  No visits were found meeting these conditions  Showing future appointments within next 150 days and meeting all other requirements       The patient was identified by name and date of birth  Brando Issa was informed that this is a telemedicine visit and that the visit is being conducted [employer]  She agrees to proceed     My office door was closed  No one else was in the room  She acknowledged consent and understanding of privacy and security of the video platform  The patient has agreed to participate and understands they can discontinue the visit at any time  Patient is aware this is a billable service  Milli Wynne is a 25 y o  female         HPI     Past Medical History:   Diagnosis Date    Anxiety     Depression     Panic attack     Psychiatric disorder        No past surgical history on file  Current Outpatient Medications   Medication Sig Dispense Refill    [START ON 5/15/2023] cholecalciferol (VITAMIN D3) 1,000 units tablet Take 1 tablet (1,000 Units total) by mouth daily Do not start before May 15, 2023  30 tablet 0    cyanocobalamin (VITAMIN B-12) 500 MCG tablet Take 1 tablet (500 mcg total) by mouth daily 30 tablet 0    ergocalciferol (VITAMIN D2) 50,000 units Take 1 capsule (50,000 Units total) by mouth once a week for 10 doses 10 capsule 0    melatonin 3 mg Take 2 tablets (6 mg total) by mouth daily at bedtime for 30 doses 60 tablet 0    paliperidone (INVEGA) 3 mg 24 hr tablet Take 1 tablet (3 mg total) by mouth daily Do not start before April 26, 2023  30 tablet 0    sertraline (ZOLOFT) 50 mg tablet Take 1 tablet (50 mg total) by mouth daily Do not start before April 26, 2023  30 tablet 0     No current facility-administered medications for this visit  No Known Allergies    Review of Systems    Video Exam    There were no vitals filed for this visit  Physical Exam     I spent FOUR GROUP HOURS PLUS CASE MANAGEMENT minutes with patient today in which greater than 50% of the time was spent in counseling/coordination of care regarding PHP - SEE NOTES

## 2023-05-05 NOTE — PSYCH
Subjective:     Patient ID: Shavon Hope is a 25 y o  female  [facility] Clinical Progress Notes      Specialized Services Documentation  Therapist must complete separate progress note for each specific clinical activity in which the individual participated during the day  Group Psychotherapy Life Skills ([id number]) - Shavon Hope actively engaged in group focused on gratitude which was facilitated virtually in a private office using HIPAA Compliant and Approved [employer] Teams  [facility] consented to the use of tele-video modality of treatment and was virtually present for group psychotherapy today  Group members discussed why it is important to think about what we are grateful for  Group members worked on a gratefulness worksheet and shared their responses in group  Group members also learned about the G L A D  technique for practice gratefulness  Sarmad stated that she is grateful for pets and the weekend forecast  Group watched a short video about the 365 grateful project with speaker Jenna Lesches  We discussed the benefits of thinking, reflecting and talking about what they are grateful for  [facility] continues to make progress towards goals through verbal participation in group; to accomplish long term goals continue to utilize skills learned in programming  Continue with psychotherapy to educate and encourage use of wellness tools   Tx Plan Objective: 1 1,1 2 Therapist: Ryan Vela ; Co-facilitated with Cyn Granda RN

## 2023-05-05 NOTE — PSYCH
[facility] Discharge Instructions:   Disposition: Home  Address: [address]   Diagnosis:  1  Mood disorder with psychosis ([address] )        2  JAIME (generalized anxiety disorder)            Allergies (Drug/Food): No Known Allergies    Activity: No recommendations   Diet:no recommendations  Smoking Cessation: The best thing you can do to improve your health is to stop using tobacco  Diagnostic/Laboratory Orders: no labs ordered  Vaccines: If you received a vaccine, please notify your family physician on your next visit  For more information, please call [phone]  Follow-up appointments/Referrals:   Current Psychiatrist: LESLEY 05/23/23 at [address] [facility], [address]  [place], [address] [facility]  [phone]     Therapist  None, will schedule with Gi after medication appointment  Primary Care Physician  Tony Serra  [address] [facility] [address] [address]  P- [phone]  F- [phone]    ICM/CTT: [facility]: [phone]    Intake/Referral/Evaluation (Non-Emergency) *NON INSURED FOR FUNDING: [facility]: [phone], [facility]: [phone], [facility]: [phone] and [place]: [phone]  Crisis Intervention (Emergency) [place] Service: [facility]: [phone], [place]: [phone], [address] [place] [address] Ave: [phone], [facility] [phone], [facility]: [phone] and C/M/P: [phone]  _________________________________  National Crisis Intervention Hotline: 5-864.193.8426  National Suicide Crisis Hotline: 0-424.246.7856  I, the undersigned, have received and understand the above instructions          Patient/Rep Signature: __________________________________       Date/Time: ______________         Physician Signature: ____________________________________      Date/Time: ______________               Signature: ________________________________       Date/Time: ______________

## 2023-05-05 NOTE — PSYCH
Progress Note - Behavioral Health   [facility], [facility]  Sonia Walker 25 y o  female MRN: [id number]     Progress at partial program: slowly improving  [facility] seen today, presents as guarded, flat, restricted  However, states she is doing much improved since inpatient discharge on 4/18/2023  Believes this current medication regimen of Invega 3 mg daily, Zoloft 50 mg daily is much more effective than past Risperdal in controlling her mood and depression  Describes improving 2/10 depression with occasional periods of dysphoria, sadness  Occasional high levels of anxiety depending on external stressors, denies any recent flareups or panic attacks  Cause of anxiety appears to be social situations and afraid of being judged by others  A major goal she is working on a partial program includes increasing socialization, pushing herself out of the house and  make friends  Making mild progress as she reports leaving her house more often to visit her sister who lives down the road  History of prior delusions, denies all delusions since hospital discharge  No evidence of internal preoccupation or elevated mood symptoms during today's encounter  Sleeping well with Melatonin  Continue group therapy, consider individual psychotherapy and follow-up with outpatient psychiatrist after completing partial  Denies SI/HI           Current Medication:     Current Outpatient Medications:     ergocalciferol (VITAMIN D2) 50,000 units, Take 1 capsule (50,000 Units total) by mouth once a week for 10 doses, Disp: 10 capsule, Rfl: 0    melatonin 3 mg, Take 2 tablets (6 mg total) by mouth daily at bedtime for 30 doses, Disp: 60 tablet, Rfl: 0    paliperidone (INVEGA) 3 mg 24 hr tablet, Take 1 tablet (3 mg total) by mouth daily Do not start before April 26, 2023 , Disp: 30 tablet, Rfl: 0    sertraline (ZOLOFT) 50 mg tablet, Take 1 tablet (50 mg total) by mouth daily Do not start before April 26, 2023 , Disp: 30 tablet, Rfl: 0    [START ON 5/15/2023] cholecalciferol (VITAMIN D3) 1,000 units tablet, Take 1 tablet (1,000 Units total) by mouth daily Do not start before May 15, 2023 , Disp: 30 tablet, Rfl: 0    cyanocobalamin (VITAMIN B-12) 500 MCG tablet, Take 1 tablet (500 mcg total) by mouth daily, Disp: 30 tablet, Rfl: 0  Medication side effects: pro    ROS:   As above otherwise negative    Active Ambulatory Problems     Diagnosis Date Noted    Mood disorder with psychosis ([facility] ) 2023    JAIME (generalized anxiety disorder) 2023     Resolved Ambulatory Problems     Diagnosis Date Noted    MDD (major depressive disorder), single episode, severe with psychotic features ([facility] ) 2023     Past Medical History:   Diagnosis Date    Anxiety     Depression     Panic attack     Psychiatric disorder      Family History   Problem Relation Age of Onset    No Known Problems Mother     No Known Problems Father     Lymphoma Maternal Grandmother     Diabetes Maternal Grandmother     Prostate cancer Maternal Grandfather      Social History     Socioeconomic History    Marital status: Single     Spouse name: Not on file    Number of children: Not on file    Years of education: Not on file    Highest education level: Not on file   Occupational History    Not on file   Tobacco Use    Smoking status: Former     Types: Cigarettes     Quit date: 3/17/2023     Years since quittin 1     Passive exposure: Past    Smokeless tobacco: Never   Vaping Use    Vaping Use: Never used   Substance and Sexual Activity    Alcohol use: Not Currently    Drug use: Not Currently    Sexual activity: Not Currently   Other Topics Concern    Not on file   Social History Narrative    Not on file     Social Determinants of Health     Financial Resource Strain: Not on file   Food Insecurity: Not on file   Transportation Needs: Not on file   Physical Activity: Not on file   Stress: Not on file   Social Connections: Not on file Intimate Partner Violence: Not on file   Housing Stability: Not on file     No Known Allergies       Mental Status Evaluation:    Appearance:  age appropriate, casually dressed   Behavior:  pleasant, cooperative   Speech:  slow   Mood:  depressed   Affect:  constricted   Thought Process:  organized   Associations: intact associations   Thought Content:  no overt delusions   Perceptual Disturbances: none   Risk Potential: Suicidal ideation - None  Homicidal ideation - None   Sensorium:  oriented to person, place and time/date   Memory:  recent and remote memory grossly intact   Consciousness:  alert and awake   Attention: attention span and concentration are age appropriate   Insight:  age appropriate   Judgment: age appropriate   Gait/Station: normal gait/station   Motor Activity: no abnormal movements       Laboratory results:   I have personally reviewed all pertinent laboratory/tests results  Most Recent Labs:   Lab Results   Component Value Date    WBC 5 83 04/17/2023    RBC 4 14 04/17/2023    HGB 13 6 04/17/2023    HCT 42 3 04/17/2023     04/17/2023    RDW 12 0 04/17/2023    NEUTROABS 4 39 04/17/2023    SODIUM 136 04/17/2023    K 4 3 04/17/2023     04/17/2023    CO2 27 04/17/2023    BUN 17 04/17/2023    CREATININE 0 67 04/17/2023    GLUC 131 04/17/2023    GLUF 88 03/28/2023    CALCIUM 8 9 04/17/2023    AST 17 04/17/2023    ALT 25 04/17/2023    ALKPHOS 33 (L) 04/17/2023    TP 6 6 04/17/2023    ALB 4 1 04/17/2023    TBILI 0 91 04/17/2023    [id number] 0 972 04/17/2023         Assessment   Diagnoses and all orders for this visit:    Mood disorder with psychosis ([facility] )    JAIME (generalized anxiety disorder)         Plan    Planned medication and treatment changes:    -Continue current medication regimen of Zoloft 50 mg daily, Invega 3 mg daily, melatonin 6 mg at bedtime  Denies all side effects  No evidence of motor symptoms  All current active medications have been reviewed    Continue treatment with group therapy and partial program      Risks / Benefits of Treatment:    Risks, benefits, and possible side effects of medications explained to patient and patient verbalizes understanding and agrees to medications  Counseling / Coordination of Care:    Patient's progress discussed with staff in treatment team meeting  Medications, treatment progress and treatment plan reviewed with patient  ÁNGELA Martines 05/05/23    Telemedicine consent    Patient: Héctor Long  Provider: Cristino Tate      The patient was identified by name and date of birth  Héctor Long was informed that this is a telemedicine visit and that the visit is being conducted through the [employer]  She agrees to proceed     My office door was closed  No one else was in the room  She acknowledged consent and understanding of privacy and security of the video platform  The patient has agreed to participate and understands they can discontinue the visit at any time      Visit Time    Visit Start Time: 997  Visit Stop Time: 115  Total Visit Duration: 15 minutes

## 2023-05-05 NOTE — PSYCH
Subjective:     Patient ID: Angelina Beck is a 25 y o  female  [facility] Clinical Progress Notes      Specialized Services Documentation  Therapist must complete separate progress note for each specific clinical activity in which the individual participated during the day  Group Psychotherapy     This group was facilitated virtually in a private office using HIPAA Compliant and Approved [employer] Teams  Angelina Beck  consented to the use of tele-video modality of treatment  ([id number])  Angelina Beck shared in psychotherapy group exploring the weekly wellness assessment  Participants explored successes and challenges in wellness areas throughout this past week - physical, social, vocational, spiritual, cognitive, and emotional   Some positive progress toward goal noted  Continue psychotherapy to encourage self-awareness and home practice of skills to support wellness      Treatment Plan Objective: 1 1, 1 2, 1 3, 1 4    Therapist: Kristine RODRIGUEZ RN and Marry CARMONA

## 2023-05-08 ENCOUNTER — OFFICE VISIT (OUTPATIENT)
Dept: PSYCHOLOGY | Facility: CLINIC | Age: 28
End: 2023-05-08

## 2023-05-08 ENCOUNTER — TELEMEDICINE (OUTPATIENT)
Dept: PSYCHIATRY | Facility: CLINIC | Age: 28
End: 2023-05-08

## 2023-05-08 DIAGNOSIS — F41.1 GAD (GENERALIZED ANXIETY DISORDER): ICD-10-CM

## 2023-05-08 DIAGNOSIS — F39 MOOD DISORDER WITH PSYCHOSIS (HCC): Primary | ICD-10-CM

## 2023-05-08 NOTE — PSYCH
Subjective:     Patient ID: Jaun Fall is a 25 y o  female  [facility] Clinical Progress Notes      Specialized Services Documentation  Therapist must complete separate progress note for each specific clinical activity in which the individual participated during the day  Group Psychotherapy Life Skills ([id number]) Jaun Fall actively engaged in group focused on their personal narrative using the tree of life tool  which was facilitated virtually in a private office using HIPAA Compliant and Approved [employer] Teams  [facility] consented to the use of tele-video modality of treatment and was virtually present for group psychotherapy today  The group watched a ELVA talk “The importance of sharing your story” with speaker Manny Mike  The group created their own tree of life which represents who they are  They had to write words that answered the questions about each part of the tree which represents them  Group members identified what they learned by doing this activity  Sarmad recognized that she needs individual therapy in order to grow  Sarmad created a goal to work towards accomplishing the one area that she wants to grow  Group members discussed why it is important to share their story with at least one person  Sarmad continues to make progress towards goals through verbal participation in group; to accomplish long term goals continue to utilize skills learned in programming  Continue with psychotherapy to educate and encourage use of wellness tools   Tx Plan Objective: 1 1,1 2 Therapist: John Buckner ; co-facilitated with Wu Simental RN

## 2023-05-08 NOTE — PSYCH
Visit Time    Visit Start Time: 7750  Visit Stop Time: 1330  Total Visit Duration: 60 minutes    Subjective:     Patient ID: Crystal Sarmiento is a 25 y o  female  [facility] Clinical Progress Notes      Specialized Services Documentation  Therapist must complete separate progress note for each specific clinical activity in which the individual participated during the day  Group Psychotherapy - Cognitive Defusion  This group was facilitated virtually in a private office using HIPAA compliant and approved [employer] teams  Crystal Sarmiento consented to the use of tele-video modality of treatment Crystal Sarmiento attended group- Cognitive Defusion  Today, members focused on learning about defusion and how to integrate its techniques within their daily lives  Members practiced specific techniques and discussed:  • Fusion vs  Defusion   • Defusion strategies and techniques  • Defusion exercise  • Benefits of defusion   • Supplemental materials/worksheets  Group members shared pieces of information from these sections and identified ways they could practice/integrate these skills in their life  This writer encouraged the group members to use the supplemental materials inside and outside of the program  Crystal Sarmiento made good efforts towards progress goals which were displayed through participation and engagement in topic  Crystal Sarmiento identified sarcastic voice as a defusion technique they would like to practice  Tx Plan Objective: 1 1,1 2,1 4  Therapist: Ryann Roberts [id number], [facility] [place]    Education Therapy   [id number] Crystal Sarmiento actively shared in morning assessment and goal review  Presented as Receptive related to readiness to learn  Crystal Sarmiento did complete goal from last treatment day identifying gaining support  did not present with any barriers to learning  [address] engaged throughout the treatment day   Was engaged in learning related to Illness, Medication, Aftercare, and Wellness Tools  Staff utilized Verbal, Written, A/V, and Demonstration teaching methods  Naida Flaming shared area of learning and set a goal for outside of program to manage her health insurance        Tx Plan Objective: 1 1,1 2,1 4, Therapist: Jerad Davila

## 2023-05-08 NOTE — PSYCH
Virtual Regular Visit    Verification of patient location:    Patient is located at Home in the following state in which I hold an active license [place]      Assessment/Plan:    Problem List Items Addressed This Visit        Other    Mood disorder with psychosis ([address] ) - Primary    JAIME (generalized anxiety disorder)       Goals addressed in session:           Reason for visit is PHP VIRTUAL GROUP DUE TO virtual preference of care       Encounter provider Marlon Aguilar    Provider located at [address] [place] [phone]      Recent Visits  Date Type Provider Dept   05/05/23 Office Visit [facility] PARTIAL PSYCH GROUP THERAPY [facility]   05/04/23 Office Visit [facility] PARTIAL PSYCH GROUP THERAPY [facility]   05/03/23 Office Visit [facility] PARTIAL PSYCH GROUP THERAPY [facility]   05/02/23 Office Visit [facility] PARTIAL PSYCH GROUP THERAPY [facility]   05/01/23 Office Visit [facility] PARTIAL PSYCH GROUP THERAPY [facility]   Showing recent visits within past 7 days and meeting all other requirements  Today's Visits  Date Type Provider Dept   05/08/23 Office Visit [facility] PARTIAL PSYCH GROUP THERAPY [facility]   Showing today's visits and meeting all other requirements  Future Appointments  No visits were found meeting these conditions  Showing future appointments within next 150 days and meeting all other requirements       The patient was identified by name and date of birth  Js Crew was informed that this is a telemedicine visit and that the visit is being conducted [employer]  She agrees to proceed     My office door was closed  No one else was in the room  She acknowledged consent and understanding of privacy and security of the video platform   The patient has agreed to participate and understands they can discontinue the visit at any time     Patient is aware this is a billable service  Paul Quiroga is a 25 y o  female   HPI     Past Medical History:   Diagnosis Date   • Anxiety    • Depression    • Panic attack    • Psychiatric disorder        No past surgical history on file  Current Outpatient Medications   Medication Sig Dispense Refill   • [START ON 5/15/2023] cholecalciferol (VITAMIN D3) 1,000 units tablet Take 1 tablet (1,000 Units total) by mouth daily Do not start before May 15, 2023  30 tablet 0   • cyanocobalamin (VITAMIN B-12) 500 MCG tablet Take 1 tablet (500 mcg total) by mouth daily 30 tablet 0   • ergocalciferol (VITAMIN D2) 50,000 units Take 1 capsule (50,000 Units total) by mouth once a week for 10 doses 10 capsule 0   • melatonin 3 mg Take 2 tablets (6 mg total) by mouth daily at bedtime for 30 doses 60 tablet 0   • paliperidone (INVEGA) 3 mg 24 hr tablet Take 1 tablet (3 mg total) by mouth daily Do not start before April 26, 2023  30 tablet 0   • sertraline (ZOLOFT) 50 mg tablet Take 1 tablet (50 mg total) by mouth daily Do not start before April 26, 2023  30 tablet 0     No current facility-administered medications for this visit  No Known Allergies    Review of Systems    Video Exam    There were no vitals filed for this visit      Physical Exam     I spent FOUR GROUP HOURS PLUS CASE MANAGEMENT minutes with patient today in which greater than 50% of time was spent in counseling/coordination of care regarding PHP - SEE NOTES

## 2023-05-08 NOTE — PSYCH
"Assessment/Plan:       Diagnoses and all orders for this visit:     Mood disorder with psychosis ([address] )     JAIME (generalized anxiety disorder)        Subjective:   Patient ID: Moody Feliciano is a 25 y o  female  [facility] Treatment Plan   AREAS OF NEED: Depression and anxiety as evidenced by panic attacks, isolation, low motivation, and lack of interest due to work stress, financial stress and multiple hospitalizations  Date Initiated: 04/28/23     Strengths: \"organized, efficient, and a problem solver\"            LONG TERM GOAL:   Date Initiated: 04/28/23  1 0 I will identify three ways that my overall well being has improved since attending [facility]  Target Date: 05/26/23  Completion Date: 05/08/23 discharge        SHORT TERM OBJECTIVES:      Date Initiated: 04/28/23  1 1 I will participate at least twice in each group throughout the day in order to increase my ability to share in group settings  Revision Date: 05/08/23 discharge  Target Date: 05/09/23  Completion Date: 05/08/23 discharge     Date Initiated: 04/28/23  1 2 I will practice and implement at least three positive affirmations daily in order to increase my self-esteem and replace negative thoughts with positive thoughts  Revision Date: 05/08/23 discharge  Target Date: 05/09/23  Completion Date:05/08/23 discharge     Date Initiated: 04/28/23  1 3 I will take medications as prescribed and share questions and concerns if arise  Revision Date:05/08/23 discharge  Target Date: 05/09/23  Completion Date: 05/08/23 discharge     Date Initiated: 04/28/23  1 4 I will identify 3 ways my supports can assist in my recovery and agree to staff/support contact as indicated      Revision Date:05/08/23 discharge  Target Date: 05/09/23  Completion Date:05/08/23 discharge            7 DAY REVISION:     Date Initiated:  Revision Date:   Target Date:   Completion Date:        PSYCHIATRY:  Date Initiated:  04/28/23  Medication Management and Education       " Revision Date: 05/08/23 discharge      The person(s) responsible for carrying out the plan is Dr Araceli Batres DO     NURSING/SYMPTOM EDUCATION:  Date Initiated: 04/28/23       1 1, 1 2  1 3, 1 4 Provide wellness/symptoms and skill education groups three to five days weekly to educate Lucy Rm on signs and symptoms of diagnoses, skills to manage stressors, and medication questions that will be addressed by the treatment team         Revision date:05/08/23 discharge       The person(s) responsible for carrying out the plan is Mariangel Christian RN, BSN     PSYCHOLOGY:   Date Initiated: 04/28/23       1 1, 1 2, 1 4 Provide psychotherapy group 5 times per week to allow opportunity for Lucy Rm  to explore stressors and ways of coping  Revision Date: 05/08/23 discharge  The person(s) responsible for carrying out the plan is MONI Valle; Franklyn Avila [place]     ALLIED THERAPY:   Date Initiated: 04/28/23  1 1,1 2 Engage Lucy Rm in AT group 5 times daily to encourage development and use of wellness tools to decrease symptoms and promote recovery through meaningful activity  Revision Date: 05/08/23 discharge      The person(s) responsible for carrying out the plan is JOSESITO Jones, JOSESITO Santana     CASE MANAGEMENT:   Date Initiated: 04/28/23      1 0 This  will meet with Lucy Rm  3-4 times weekly to assess treatment progress, discharge planning, connection to community supports and UR as indicated  Revision Date: 05/08/23 discharge  The person(s) responsible for carrying out the plan is JOSESITO Jones     TREATMENT REVIEW/COMMENTS:      DISCHARGE CRITERIA: Identify 3 signs of progress and complete relapse prevention plan  DISCHARGE PLAN: Connect with identified outpatient providers     Estimated Length of Stay: 10 treatment days       Diagnosis and Treatment Plan explained to Sarmad, Sarmad relates understanding diagnosis and is agreeable to Treatment Plan             CLIENT COMMENTS / Please share your thoughts, feelings, need and/or experiences regarding your treatment plan:

## 2023-05-08 NOTE — PSYCH
"    Subjective:     Patient ID: Vinton Schwab is a 25 y o  female  [facility] Clinical Progress Notes      Specialized Services Documentation  Therapist must complete separate progress note for each specific clinical activity in which the individual participated during the day  Group Psychotherapy  This group was facilitated virtually in a private office using HIPAA Compliant and Approved [employer] Teams  Vinton Schwab  consented to the use of tele-video modality of treatment  ([id number]) Vinton Schwab actively participated in psychoeducation group this afternoon which focused on sleep hygiene  Group then identified sleep hygiene habits that are currently effective and habits they wish to incorporate into their night time routine to promote sleep hygiene  This writer explained the importance of quality sleep in relation to wellness  Sleep diary and additional tips on sleep hygiene were discussed  Then, a video titled \"How to Improve Your Sleep\" was viewed  Good progress toward goal observed  Continue psychoeducation group to increase awareness of good sleeping habits to promote wellness        Tx Plan Objectives: 1 1, 1 2      Madelyn RODRIGUEZ RN & Alpa Berry BA    "

## 2023-05-08 NOTE — PSYCH
Subjective:     Patient ID: Oniel Jhaveri is a 25 y o  female  [facility] Clinical Progress Notes      Specialized Services Documentation  Therapist must complete separate progress note for each specific clinical activity in which the individual participated during the day  Case Management Note    JOSESITO Omalley    Current suicide risk : Low     [id number] this writer met with Oniel Jhaveri for case management, discharge meeting  This writer reviewed discharge instructions, medication list, relapse prevention plan and PHQ-9  Sarmad stated her three signs of improvement were getting up and out more, talking with others more, and less ruminating thoughts  Sarmad stated she is using positive affirmations more  Discussed follow up with [address] [place] [address] for medication management, and will schedule with a therapist after med appointment  No other concerns at this time  Continue with discharge at the end of the treatment day  I,Lizette Hicks,am physically unable to provide a signature; however, I understand the nature of and am in agreement with the documentation presented to me via TEAMS  I have received a copy through My Chart and/or the [address] [place] [address],[address] [employer]  I freely give verbal consent  Name of Document (s): Discharge instructions, medication list, relapse prevention plan and phq-9  Witness to verbal consent: Ayaka Genao  Witness to verbal consent: Larissa Solis    Medications changes/added/denied? No see DELMAR Elizabeth's note  Treatment session number: 7    Individual Case Management Visit provided today? Yes     [facility] follow up physician's orders: DISCHARGE from [facility], see DELMAR Elizabeth/Dr Bennett notes

## 2023-05-08 NOTE — PSYCH
"Subjective:     Patient ID: Crystal Sarmiento is a 25 y o  female  [facility] Discharge Summary:   Admission Date: 04/28/23  Patient was referred by [facility]  Discharge Date: 05/08/23   Was this a routine discharge? yes   Diagnosis: Axis I:   1  Mood disorder with psychosis ([address] )        2  JAIME (generalized anxiety disorder)           Treating Physician: Dr Joseph Lockett DO    Treatment Complications: limited engagement verbally, minimal answers  Presenting Need:   As per Dr Joseph Lockett, :   Sarmad TEETEE Hicks is a 25 y o  female with past psychiatric history of depression and anxiety is admitted to [facility] by [facility]      TodayLizette Hicks reports she was recently discharged from hospital, after third admission this year 2023   States she initially had depression and was on Prozac, but caused delusional thoughts   States she tried to follow up after first hospitalization with [facility], and was still delusional, was on Abilify  Was having delusions that people were sending messages to her via Spotify   States she did notice she was also having a decreased need for sleep, and was obsessive with wanting to listen to Spotify  Samantha Bell was restricting her eating during that time, very obsessive   Was hospitalized a second time   States she was on Risperdal and Zoloft during second hospitalization, but found risperdal caused side effects   Was tapered off in the outpatient setting, but still was having side effects, was having \"panic attacks\" and suicidal thoughts   States on third hospitalization, was started on Invega and seems \"much better\" and also on Zoloft 50mg daily   States she feels the best she has ever felt   States she has struggled with social anxiety much of her life, as well as some mild depression   States she has been feeling like everything was a routine and \"just same thing every day\"   States that working for the past 3 years " "with [employer]   States work is stressful and not rewarding  States she is planning on quitting   She states she is not sure what her dream job would be  Currently living with parents, has one sister, who lives a few blocks away  She had went to [employer]  Graduated in mathematics   Did not fird job first year out, and felt depressed  States she does struggle with social anxiety and can be \"avoidant\" at times   States she feels it comes from low self-esteem  Nick Powell admits she does not have many friends, and can be avoidant at times  Nick Powell states that she does want to try and meet more people  Nick Powell states she does not have many friends outside of her immediate family, has not kept in touch with people from college  Nick Powell states this is something she would like to work on in the [facility] program   Psychosocial Stressors include social anxiety, job stress       As per this writer: Sarmad is a 25year old female who was referred to [facility] by [address] unit due to worsening depression and frequent hospitalizations  Today HerbKingston stated that her current stressors include; her job and financial stress  HerbKingston stated that she is an  at [employer], and has been working there for the last six years  HerbKingston stated that she will be submitting her resignation on Monday, and feels less stress due to this decision  HerbKingston stated that she also has some financial stress due to [employer] and \"not making enough at her job\"  HerbKingston stated that her current symptoms include; social anxiety \"for a long time\", a low level of depression that has started to worsen over the past few months, panic attacks where she feels as if she cannot breathe that last 45-60 minutes, and delusions that consist of \"thinking people are sending me messages through music\"  HerbKingston stated that she has had three inpatient admissions all from this year   [facility] stated no past history of " "outpatient care  [facility] stated that she has supports in her family that consist of her mother and father, who she lives with, and her younger sister who lives a few blocks away  [facility] stated that she has no history of domestic violence or trauma  [place] stated that her sleep is \"back to normal\" stating that she was only sleeping 4-6 hours when she was experiencing delusions  [facility] stated that she is now eating three meals a day, and is completing ADLs and making time to exercise and do her makeup  [place] stated that she has a Bachelor's degree in [employer]  [facility] reported vaping nicotine throughout the day, and has coffee and some soda daily as her caffeine  [place] stated that she has no access to weapons, no past suicide attempts, no SI, HI, SIB, or hallucinations  Lizette's goals for program are to work on her social anxiety and self-esteem  PHQ-9 at intake is 8       As per Moriah Atwood : \"thinking people are sending me messages through music\", \"I want to work on my self-esteem\"       Strengths: organized, efficient, problem solving    Course of treatment includes:    group counseling, medication management, individual case management, allied therapy, psychoeducation, and psychiatric evaluation    Treatment Progress: Moriah Atwood attended 7 days of PHP in which Sarmad challenged negative thoughts, engaging in healthy coping strategies and learned ways to manage her anxiety  Moriah Atwood was a minimally active participant in program and in individual work  [facility] actively worked on suggestions and practiced coping skills  [facility] was more aware of coping skills available  [facility] was open to learning about positive affirmations  [facility] was able to identify some issues and able to problem solve options  [facility] shared improvement through getting up and out more often, being more comfortable talking with others, and very few ruminating thoughts   [facility] identified an increase in " her ability to be comfortable with emotions when talking to others  Denied SI, HI, and psychosis  Aftercare providers to receive summary  Aftercare recommendations include:   Current Psychiatrist: LESLEY 05/20/23 at [facility], [address] [place] [address], [address] [facility]  [phone]     Therapist  Will schedule with [facility] after medication check       Primary Care Physician  [address] [place] [address] [address]  [place] [address]  [facility], [facility]- [phone]  - [phone]    Discharge Medications include:  Current Outpatient Medications:   •  [START ON 5/15/2023] cholecalciferol (VITAMIN D3) 1,000 units tablet, Take 1 tablet (1,000 Units total) by mouth daily Do not start before May 15, 2023 , Disp: 30 tablet, Rfl: 0  •  cyanocobalamin (VITAMIN B-12) 500 MCG tablet, Take 1 tablet (500 mcg total) by mouth daily, Disp: 30 tablet, Rfl: 0  •  ergocalciferol (VITAMIN D2) 50,000 units, Take 1 capsule (50,000 Units total) by mouth once a week for 10 doses, Disp: 10 capsule, Rfl: 0  •  melatonin 3 mg, Take 2 tablets (6 mg total) by mouth daily at bedtime for 30 doses, Disp: 60 tablet, Rfl: 0  •  paliperidone (INVEGA) 3 mg 24 hr tablet, Take 1 tablet (3 mg total) by mouth daily Do not start before April 26, 2023 , Disp: 30 tablet, Rfl: 0  •  sertraline (ZOLOFT) 50 mg tablet, Take 1 tablet (50 mg total) by mouth daily Do not start before April 26, 2023 , Disp: 30 tablet, Rfl: 0

## 2023-05-08 NOTE — PSYCH
"    Virtual Regular Visit    Verification of patient location:    Patient is located at Home in the following state in which I hold an active license [place]             Encounter provider Tess Okeefe PA-C    Provider located at  [id number] [facility]  [address] [address] [phone]      Recent Visits  Date Type Provider Dept   05/05/23 Telemedicine [facility]robinson [facility]   Showing recent visits within past 7 days and meeting all other requirements  Future Appointments  No visits were found meeting these conditions  Showing future appointments within next 150 days and meeting all other requirements       The patient was identified by name and date of birth  Naida Doty was informed that this is a telemedicine visit and that the visit is being conducted [employer]  She agrees to proceed     My office door was closed  No one else was in the room  She acknowledged consent and understanding of privacy and security of the video platform  The patient has agreed to participate and understands they can discontinue the visit at any time  Patient is aware this is a billable service  Progress Note - [facility]  Naida Doty 25 y o  female MRN: [id number]     Progress at partial program: improved  Chart reviewed and discussed in treatment team   [facility] feels ready for discharge  Feels program has been beneficial and meds are \"doing good\"  Has f/u [facility] on 5/23  Denies side effects to meds inc tremor, diarrhea, headaches    Denies SI      ROS:   As above otherwise negative    Active Ambulatory Problems     Diagnosis Date Noted   • Mood disorder with psychosis ([facility] ) 03/11/2023   • JAIME (generalized anxiety disorder) 03/28/2023     Resolved Ambulatory Problems     Diagnosis Date Noted   • MDD (major depressive disorder), single episode, " severe with psychotic features ([facility] ) 03/28/2023     Past Medical History:   Diagnosis Date   • Anxiety    • Depression    • Panic attack    • Psychiatric disorder      Family History   Problem Relation Age of Onset   • No Known Problems Mother    • No Known Problems Father    • Lymphoma Maternal Grandmother    • Diabetes Maternal Grandmother    • Prostate cancer Maternal Grandfather      No Known Allergies       Mental Status Evaluation:    Appearance:  age appropriate, casually dressed   Behavior:  cooperative   Speech:  normal rate and volume   Mood:  improved   Affect:  constricted   Thought Process:  goal directed   Associations: intact associations   Thought Content:  no overt delusions   Perceptual Disturbances: none   Risk Potential: Suicidal ideation - None  Homicidal ideation - None   Sensorium:  oriented to person, place and time/date   Memory:  recent and remote memory grossly intact   Consciousness:  alert and awake   Attention: attention span and concentration are age appropriate   Insight:  intact   Judgment: intact   Gait/Station: unable to assess   Motor Activity: unable to assess today due to virtual visit       Laboratory results: I have personally reviewed all pertinent laboratory/tests results        Assessment   Diagnoses and all orders for this visit:    Mood disorder with psychosis ([place] [id number] )    JAIME (generalized anxiety disorder)       Current Outpatient Medications   Medication Sig Dispense Refill   • [START ON 5/15/2023] cholecalciferol (VITAMIN D3) 1,000 units tablet Take 1 tablet (1,000 Units total) by mouth daily Do not start before May 15, 2023  30 tablet 0   • cyanocobalamin (VITAMIN B-12) 500 MCG tablet Take 1 tablet (500 mcg total) by mouth daily 30 tablet 0   • ergocalciferol (VITAMIN D2) 50,000 units Take 1 capsule (50,000 Units total) by mouth once a week for 10 doses 10 capsule 0   • melatonin 3 mg Take 2 tablets (6 mg total) by mouth daily at bedtime for 30 doses 60 tablet 0   • paliperidone (INVEGA) 3 mg 24 hr tablet Take 1 tablet (3 mg total) by mouth daily Do not start before April 26, 2023  30 tablet 0   • sertraline (ZOLOFT) 50 mg tablet Take 1 tablet (50 mg total) by mouth daily Do not start before April 26, 2023  30 tablet 0     No current facility-administered medications for this visit  Plan    Planned medication and treatment changes:   No med change: invega 3 mg/d and zoloft 50 mg/d  [id number] for discharge  Meds reconciled  All current active medications have been reviewed  Continue treatment with group therapy and partial program  Discharge planning      Risks / Benefits of Treatment:    Risks, benefits, and possible side effects of medications explained to patient and patient verbalizes understanding and agrees to medications  Counseling / Coordination of Care:    Patient's progress discussed with staff in treatment team meeting  Medications, treatment progress and treatment plan reviewed with patient      Nallely Thurston PA-C 05/08/23

## 2023-05-09 ENCOUNTER — APPOINTMENT (OUTPATIENT)
Dept: PSYCHOLOGY | Facility: CLINIC | Age: 28
End: 2023-05-09
Payer: COMMERCIAL

## 2023-05-10 ENCOUNTER — APPOINTMENT (OUTPATIENT)
Dept: PSYCHOLOGY | Facility: CLINIC | Age: 28
End: 2023-05-10
Payer: COMMERCIAL

## 2023-05-11 ENCOUNTER — APPOINTMENT (OUTPATIENT)
Dept: PSYCHOLOGY | Facility: CLINIC | Age: 28
End: 2023-05-11
Payer: COMMERCIAL

## 2023-05-12 ENCOUNTER — APPOINTMENT (OUTPATIENT)
Dept: PSYCHOLOGY | Facility: CLINIC | Age: 28
End: 2023-05-12
Payer: COMMERCIAL